# Patient Record
Sex: MALE | Race: WHITE | NOT HISPANIC OR LATINO | Employment: FULL TIME | URBAN - METROPOLITAN AREA
[De-identification: names, ages, dates, MRNs, and addresses within clinical notes are randomized per-mention and may not be internally consistent; named-entity substitution may affect disease eponyms.]

---

## 2022-02-15 ENCOUNTER — OFFICE VISIT (OUTPATIENT)
Dept: ORTHOPEDIC SURGERY | Facility: CLINIC | Age: 57
End: 2022-02-15

## 2022-02-15 VITALS
BODY MASS INDEX: 29.55 KG/M2 | DIASTOLIC BLOOD PRESSURE: 82 MMHG | WEIGHT: 195 LBS | HEIGHT: 68 IN | SYSTOLIC BLOOD PRESSURE: 110 MMHG

## 2022-02-15 DIAGNOSIS — M24.812 INTERNAL DERANGEMENT OF LEFT SHOULDER: ICD-10-CM

## 2022-02-15 DIAGNOSIS — M25.512 ACUTE PAIN OF LEFT SHOULDER: Primary | ICD-10-CM
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
## 2022-02-15 PROCEDURE — 99203 OFFICE O/P NEW LOW 30 MIN: CPT | Performed by: PHYSICIAN ASSISTANT

## 2022-02-15 NOTE — PROGRESS NOTES
[facility] [place] [facility] [place]    Subjective     Chief Complaint   Patient presents with   • Left Shoulder - Pain     DOI: 02/10/22         HPI  Micah Viera is a 56 y.o. male.  Right-hand-dominant. New patient presents for evaluation of left shoulder/upper arm pain.    LUCI: At work carrying heavy equipment that caused pulling/strain to arm/shoulder when he felt a pop in the posterior shoulder followed by severe pain shooting along anterior arm/biceps to antecubital fossa.    Pain scale: 8/10.  Severity of the pain moderate to severe.  Quality of the pain burning, stabbing, shooting.  Associated symptoms.  Activity related to pain working, movement of joint.  Pain relieved by resting, ice.  No reported numbness or tingling.  Prior treatments none.    Notes difficulty with lifting, reaching, dressing, driving and overhead activities.    Denies fever, chills, night sweats or other constitutional symptoms.      Past Medical History:   Diagnosis Date   • Arthritis of back       Past Surgical History:   Procedure Laterality Date   • BACK SURGERY  2008   • BACK SURGERY  2009   • KNEE SURGERY Right       Family History   Problem Relation Age of Onset   • Diabetes Mother    • Hypertension Father      Social History     Socioeconomic History   • Marital status:    Tobacco Use   • Smoking status: Never Smoker   • Smokeless tobacco: Current User     Types: Snuff   Vaping Use   • Vaping Use: Never used   Substance and Sexual Activity   • Alcohol use: Yes   • Drug use: Never   • Sexual activity: Defer      No current outpatient medications on file prior to visit.     No current facility-administered medications on file prior to visit.      Allergies   Allergen Reactions   • Lortab [Hydrocodone-Acetaminophen] Hives        The following portions of the patient's history were reviewed and updated as appropriate: allergies, current medications, past family history, past medical history, past social history,  "past surgical history and problem list.    Review of Systems   Constitutional: Negative.    HENT: Negative.    Eyes: Negative.    Respiratory: Negative.    Cardiovascular: Negative.    Gastrointestinal: Negative.    Endocrine: Negative.    Genitourinary: Negative.    Musculoskeletal: Positive for arthralgias.   Skin: Negative.    Allergic/Immunologic: Negative.    Neurological: Negative.    Hematological: Negative.    Psychiatric/Behavioral: Negative.         Objective      Physical Exam  /82   Ht 172.7 cm (68\")   Wt 88.5 kg (195 lb)   BMI 29.65 kg/m²     Body mass index is 29.65 kg/m².    GENERAL APPEARANCE: awake, alert & oriented x 3, in no acute distress and well developed, well nourished  PSYCH: normal mood and affect  LUNGS:  breathing nonlabored, no wheezing  EYES: sclera anicteric, pupils equal  CARDIOVASCULAR: palpable pulses. Capillary refill less than 2 seconds  INTEGUMENTARY: skin intact, no clubbing, cyanosis  NEUROLOGIC:  Normal Sensation        Ortho Exam  Left Shoulder  Skin: Intact without any erythema, warmth. Positive Karsten muscle.  Similar finding on left.  Tenderness: Posterior into anterior shoulder radiating along biceps.  Motion: Active , Abd 140, ER (elbows at side) 65, IR side of body.  Impingement: Neer mild discomfort.  Solomon negative.  Rotaor cuff: Partha/Empty can positive. Drop arm negative. Bear hug mild discomfort.  Biceps: Speed's positive.  Motor: Grossly intact to Ax/MSC/R/U/M/AIN/PIN  Sensory: Grossly intact to Ax/MSC/R/U/M nerve distributions.  Vascular: 2+ radial pulse with brisk capillary refill into each digit.      Imaging/Studies  Ordered left shoulder plain films.  Imaging read/interpreted by Dr. Fan.    Imaging Results (Last 7 Days)     Procedure Component Value Units Date/Time    XR Shoulder 2+ View Left [[id number]] Resulted: 02/15/22 1414     Updated: 02/15/22 1415    Narrative:      Left Shoulder X-Ray    Indication: Pain    Study:  AP, axillary " lateral, and scapular Y views    Comparison: None    Findings:  No acute fractures are visualized  No bony lesions are visualized.  Normal soft tissue appearance  AC joint: Severe hypertrophic joint space narrowing with bony fragments   sitting superior to the AC joint  Glenohumeral joint: Mild joint space narrowing with an early osteophyte   noted off the inferior aspect of the humeral head  Acromion type: 2      Impression:    No acute bony abnormalities noted  Type II acromion  Severe hypertrophic AC joint arthritis            Assessment/Plan        ICD-10-CM ICD-9-CM   1. Acute pain of left shoulder  M25.512 719.41   2. Internal derangement of left shoulder  M24.812 719.81   3. Work related injury  Y99.0 959.9       Orders Placed This Encounter   Procedures   • XR Shoulder 2+ View Left        -Acute left shoulder pain with internal derangement (suspect proximal biceps rupture and questionable rotator cuff tear) secondary to work related injury.  -Ordered MRI of left shoulder  -Recommend OTC pain medication.  -Off work until MRI completed and results known.  -Follow up after MRI completed--appointment needs to be on Tuesday or Thursday.  -Questions and concerns answered.    History, exam and imaging discussed with Dr. Fan, who agrees with above assessment and plan.      Medical Decision Making  Management Options : over-the-counter medicine  Data/Risk: radiology tests       Tiffanie Ramos PA-C  02/15/22  22:40 EST               EMR Dragon/Transcription disclaimer:  Much of this encounter note is an electronic transcription of spoken language to printed text. Electronic transcription of spoken language may permit erroneous, or at times, nonsensical words or phrases to be inadvertently transcribed. Although I have reviewed the note for such errors, some may still exist.

## 2022-03-15 ENCOUNTER — HOSPITAL ENCOUNTER (OUTPATIENT)
Dept: MRI IMAGING | Facility: HOSPITAL | Age: 57
Discharge: HOME | End: 2022-03-15
Admitting: PHYSICIAN ASSISTANT

## 2022-03-15 DIAGNOSIS — M25.512 ACUTE PAIN OF LEFT SHOULDER: ICD-10-CM

## 2022-03-15 DIAGNOSIS — M24.812 INTERNAL DERANGEMENT OF LEFT SHOULDER: ICD-10-CM
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
## 2022-03-15 PROCEDURE — 73221 MRI JOINT UPR EXTREM W/O DYE: CPT

## 2022-03-22 ENCOUNTER — OFFICE VISIT (OUTPATIENT)
Dept: ORTHOPEDIC SURGERY | Facility: CLINIC | Age: 57
End: 2022-03-22

## 2022-03-22 VITALS
DIASTOLIC BLOOD PRESSURE: 80 MMHG | WEIGHT: 196 LBS | BODY MASS INDEX: 29.7 KG/M2 | HEIGHT: 68 IN | SYSTOLIC BLOOD PRESSURE: 122 MMHG

## 2022-03-22 DIAGNOSIS — S46.212D RUPTURE OF PROXIMAL BICEPS TENDON, LEFT, SUBSEQUENT ENCOUNTER: ICD-10-CM

## 2022-03-22 DIAGNOSIS — M19.012 OSTEOARTHRITIS OF LEFT ACROMIOCLAVICULAR JOINT: ICD-10-CM

## 2022-03-22 DIAGNOSIS — M75.92 SUPRASPINATUS TENDINITIS, LEFT: ICD-10-CM

## 2022-03-22 DIAGNOSIS — M25.512 ACUTE PAIN OF LEFT SHOULDER: Primary | ICD-10-CM
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
## 2022-03-22 PROCEDURE — 99213 OFFICE O/P EST LOW 20 MIN: CPT | Performed by: PHYSICIAN ASSISTANT

## 2022-03-22 RX ORDER — DICYCLOMINE HYDROCHLORIDE 10 MG/1
10 CAPSULE ORAL
COMMUNITY
End: 2022-09-15

## 2022-03-22 NOTE — PROGRESS NOTES
"    [facility] Note        Subjective     CC: Follow-up (Post MRI 03/15/22 -  Acute pain of left shoulder  DOI: 02/10/22 )      DYLAN Viera is a 56 y.o. male.  Patient returns today for MRI follow-up of his left shoulder.    He continues to complain of pain with a pain scale of 8/9.  He is using a sling.  Notes he has problems with sleeping, working on movement of the shoulder joint.  Pain travels from his shoulder down to his elbow biceps.  Describes the pain as burning, stabbing, shooting.  No reported numbness or tingling.  Continues to have difficulty with any lifting, reaching, pushing, pulling and overhead activities.    Overall, patient's symptoms are unchanged since time of injury.    ROS:    Constiutional:Pt denies fever, chills, nausea, or vomiting.  MSK:as above        Objective      Past Medical History  Past Medical History:   Diagnosis Date   • Arthritis of back          Physical Exam  /80   Ht 172.7 cm (67.99\")   Wt 88.9 kg (196 lb)   BMI 29.81 kg/m²     Body mass index is 29.81 kg/m².    Patient is well nourished and well developed.        Ortho Exam  Left Shoulder  Skin: Intact without any erythema, warmth. Positive Karsten muscle.  Patient had a camera picture that showed approximately 2 days after he show me ecchymosis and bruising noted along the anterior arm consistent with proximal long head biceps rupture findings.  Tenderness:  Continues to note pain posterior into anterior shoulder with pain radiating to elbow anteriorly along biceps muscle belly.    Motion: Active , Abd 140, ER (elbows at side) 65, IR side of body.  Impingement: Neer  positive.  Solomon negative.  Rotaor cuff: Partha/Empty can positive. Drop arm negative. Bear hug mild discomfort.  ACJ: Adduction/crossover test positive.  Biceps: Speed's positive.  Motor: Grossly intact to Ax/MSC/R/U/M/AIN/PIN  Sensory: Grossly intact to Ax/MSC/R/U/M nerve distributions.  Vascular: 2+ radial " pulse with brisk capillary refill into each digit.    Left elbow  Hook test shows intact distal biceps.  Positive Karsten deformity.  Positive tenderness noted along anterior arm biceps into insertion.  Motion slightly limited due to pain.       Imaging/Labs/EMG Reviewed:  Dr. Fan and I reviewed MRI performed on 3/15/2022.    PROCEDURE: MRI SHOULDER LEFT WO CONTRAST-     HISTORY: Shoulder trauma, rotator cuff tear suspected, xray done;  M25.512-Pain in left shoulder; M24.812-Other specific joint derangements  of left shoulder, not elsewhere classified; Y99.0-Civilian activity done  for income or pay     PROCEDURE: Multiplanar multisequence imaging of the shoulder was  performed.     FINDINGS: There is thickening of the distal supraspinatus tendon  consistent with tendinosis. There is an intrasubstance tear of the  supraspinatus tendon at its attachment site to the greater tuberosity.  There is no evidence of a full-thickness rotator cuff tendon tear. There  is no evidence of abnormal rotator cuff muscle edema or atrophy. There  is a complex tear of the superior labrum. The intra-articular portions  of the biceps tendon are not well seen in the biceps tendon may either  be torn or displaced medially. There is no joint capsule thickening.  There is a type II acromion. Hypertrophic degenerative changes are  present at the acromioclavicular joint. Fluid is noted in the  subacromial/subdeltoid bursa.     IMPRESSION:     1. Findings consistent with supraspinatus tendinosis with an  intrasubstance tear of the supraspinatus tendon at its attachment site  to the greater tuberosity.  2. Complex tear of the superior labrum.  3. Nonvisualization of the intra-articular portions of the biceps tendon  which may be torn or displaced medially with joint.  4. Acromioclavicular joint space arthrosis with fluid in the  subacromial/subdeltoid bursa.     This report was signed and finalized on 3/15/2022 11:32 AM by  Karen Gama M.D..      Assessment:  1. Acute pain of left shoulder    2. Supraspinatus tendinitis, left    3. Rupture of proximal biceps tendon, left, subsequent encounter    4. Osteoarthritis of left acromioclavicular joint    5. Work related injury        Plan:  Left shoulder pain due to supraspinatus tendinitis, proximal bicep tendon rupture and ACJ osteoarthritis--work-related.  At this time no surgical intervention is warranted.  Patient was referred to formal PT (ADAMARIS Kahn).  We discussed subacromial corticosteroid injection as well as possible AC joint injection.  Patient would like to hold on any injections at this time and try formal PT first.  He needs to wean out of the sling.  Encourage range of motion of the shoulder and elbow.  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  To remain off work until next follow-up visit due to the strenuous nature of his job.  Continue with over-the-counter pain medication as needed.  Follow-up 4 weeks for repeat evaluation.  Depending on how he is doing with formal PT will consider possible injections at that time.  Questions and concerns answered.    History, exam and imaging all discussed with Dr. Fan agrees with the above assessment and plan.      Tiffanie Ramos PA-C  03/25/22  08:08 EDT      Dictated Utilizing Dragon Dictation.

## 2022-03-28 ENCOUNTER — TELEPHONE (OUTPATIENT)
Dept: ORTHOPEDIC SURGERY | Facility: CLINIC | Age: 57
End: 2022-03-28

## 2022-03-28 DIAGNOSIS — S46.212D RUPTURE OF PROXIMAL BICEPS TENDON, LEFT, SUBSEQUENT ENCOUNTER: ICD-10-CM

## 2022-03-28 DIAGNOSIS — M25.512 ACUTE PAIN OF LEFT SHOULDER: Primary | ICD-10-CM

## 2022-03-28 DIAGNOSIS — M75.92 SUPRASPINATUS TENDINITIS, LEFT: ICD-10-CM
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
## 2022-03-28 DIAGNOSIS — M19.012 OSTEOARTHRITIS OF LEFT ACROMIOCLAVICULAR JOINT: ICD-10-CM

## 2022-03-28 NOTE — TELEPHONE ENCOUNTER
SAYS IT WAS DISCUSSED WITH LEILA THAT IN THIS OFFICE WE DO NOT REPLACE THE BICEP TEAR UP TO THE SHOULDER. PATIENTS WANTS TO KNOW WHO CAN REPLACE THE BICEP MUSCLE TEAR TO THE SHOULDER IF HE NEEDS TO BE REFERRED ELSEWHERE? IF SO HIS WORKER[employer]S [employer] NEEDS TO KNOW.

## 2022-03-29 NOTE — TELEPHONE ENCOUNTER
In the majority of instances, there is no reason to do a proximal biceps repair.  In general, it does not decrease patient's ability to function normally and do normal daily activity.  It is usually treated with time, patient is in physical therapy.  If he would like another opinion, I am happy to refer him to someone else.  MRI and exam showed no indication of need for surgery.

## 2022-03-30 NOTE — TELEPHONE ENCOUNTER
Ok I called patient and explained what you said and he would like a referral to get a second opinion about surgery.   Mayela

## 2022-04-11 ENCOUNTER — TREATMENT (OUTPATIENT)
Dept: PHYSICAL THERAPY | Facility: CLINIC | Age: 57
End: 2022-04-11

## 2022-04-11 DIAGNOSIS — M25.512 ACUTE PAIN OF LEFT SHOULDER: Primary | ICD-10-CM

## 2022-04-11 PROCEDURE — 97161 PT EVAL LOW COMPLEX 20 MIN: CPT | Performed by: PHYSICAL THERAPIST

## 2022-04-11 PROCEDURE — 97110 THERAPEUTIC EXERCISES: CPT | Performed by: PHYSICAL THERAPIST

## 2022-04-11 NOTE — PROGRESS NOTES
Physical Therapy Initial Evaluation and Plan of Care      Patient: Micah Viera   : 1965  Diagnosis/ICD-10 Code:  No primary diagnosis found.  Referring practitioner: Tiffanie Ramos, *    Subjective Evaluation    History of Present Illness  Date of onset: 2/10/2022  Mechanism of injury: Pt reports that he was lifting and pulling at work and felt a pop in the L shoulder on . Pt reports that he had pain immediately and has been off work since. Pt reports he saw ortho on the  and again on the . Pt reports that MRI shows a torn biceps tendon but the doctor said he should heal in time and does not need surgery at this time. Pt reports lifting makes pain worse. Pt reports that he has been working on the shoulder to help improve motion and can go about his head with pain. Pt reports tightness and pain in the L shoulder with going behind head and behind back. Pt reports that rest helps the shoulder as well as ice.       Patient Occupation:   Pain  Current pain ratin  At best pain ratin  At worst pain ratin  Location: L shoulder  Quality: burning, dull ache and squeezing  Relieving factors: ice and rest  Aggravating factors: lifting, movement, outstretched reach, overhead activity and sleeping  Progression: improved    Hand dominance: right    Diagnostic Tests  MRI studies: abnormal    Patient Goals  Patient goals for therapy: decreased pain, increased motion, increased strength and return to work             Objective          Palpation   Left   Hypertonic in the biceps and latissimus.   Tenderness of the biceps, latissimus and supraspinatus.     Tenderness     Left Shoulder   Tenderness in the biceps tendon (proximal), coracoid process and supraspinatus tendon.     Additional Tenderness Details  Pain down into the L biceps with palpation of the coracoid process and short head of the biceps.     Neurological Testing     Reflexes   Left   Biceps  (C5/C6): trace (1+)  Brachioradialis (C6): absent (0)  Triceps (C7): absent (0)    Right   Biceps (C5/C6): normal (2+)  Brachioradialis (C6): absent (0)  Triceps (C7): absent (0)    Active Range of Motion   Left Shoulder   Flexion: 138 degrees   Abduction: 105 degrees     Right Shoulder   Flexion: 147 degrees   Abduction: 132 degrees     Passive Range of Motion   Left Shoulder   Flexion: 141 degrees   Abduction: 122 degrees   External rotation 90°: 70 degrees   Internal rotation 90°: 50 degrees     Right Shoulder   Flexion: WFL  Abduction: WFL  External rotation 90°: WFL  Internal rotation 90°: 60 degrees     Strength/Myotome Testing     Left Shoulder     Planes of Motion   External rotation at 0°: 3+   Internal rotation at 0°: 3+     Right Shoulder     Planes of Motion   External rotation at 0°: 4+   Internal rotation at 0°: 4+     Additional Strength Details  Pt with weakness due to pain.     Tests     Left Shoulder   Positive anterior slide.           Assessment & Plan     Assessment  Impairments: abnormal or restricted ROM, activity intolerance, impaired physical strength, lacks appropriate home exercise program and pain with function  Functional Limitations: carrying objects, lifting, sleeping, pushing, uncomfortable because of pain, reaching behind back and reaching overhead  Assessment details: Patient is a 56 year old male who comes to physical therapy following injury to the L shoulder at work. Signs and symptoms are consistent with L biceps tear resulting in pain, decreased ROM, decreased strength, and inability to perform all essential functional activities. Pt will benefit from skilled PT services to address the above issues.       Goals  Plan Goals: SHORT TERM GOALS:     2 weeks  1. Pt I w/ HEP  2. Pt to demonstrate PROM of the left shoulder to WFL to improve ability to perform ADL's  3. Pt to demonstrate ability to perform 30 minutes continuous activity in the clinic without increase in pain     LONG  TERM GOALS:   6 weeks  1. Pt to demonstrate AROM of the left shoulder to WNL to allow ability to perform all necessary functional activities  2. Pt to demonstrate ability to lift 5# OH with the left arm without increase in pain  3. Pt to report being able to work full duty without increase in pain in the shoulder  4. Pt to tolerate 60 minutes continuous activity in the clinic without increase in pain       Plan  Therapy options: will be seen for skilled therapy services  Planned modality interventions: cryotherapy, TENS, thermotherapy (hydrocollator packs) and ultrasound  Planned therapy interventions: body mechanics training, flexibility, functional ROM exercises, home exercise program, joint mobilization, manual therapy, motor coordination training, neuromuscular re-education, postural training, soft tissue mobilization, spinal/joint mobilization, strengthening, therapeutic activities and stretching  Frequency: 2x week  Duration in weeks: 6  Treatment plan discussed with: patient        Manual Therapy:         mins  34708;  Therapeutic Exercise:    13     mins  56039;     Neuromuscular Jessee:        mins  55720;    Therapeutic Activity:          mins  73892;     Gait Training:           mins  10613;     Ultrasound:          mins  93052;    Electrical Stimulation:         mins  90598 ( );  Dry Needling          mins self-pay    Timed Treatment:   13   mins   Total Treatment:     44   mins    PT SIGNATURE: Cj Wallace PT   [place] License: [id number]  DATE TREATMENT INITIATED: 4/11/2022    Initial Certification  Certification Period: 7/9/2022  I certify that the therapy services are furnished while this patient is under my care.  The services outlined above are required by this patient, and will be reviewed every 90 days.     PHYSICIAN: Tiffanie Ramos PA-C      DATE:     Please sign and return via fax to [phone].. Thank you, [facility].

## 2022-04-14 ENCOUNTER — TREATMENT (OUTPATIENT)
Dept: PHYSICAL THERAPY | Facility: CLINIC | Age: 57
End: 2022-04-14

## 2022-04-14 DIAGNOSIS — M25.512 ACUTE PAIN OF LEFT SHOULDER: Primary | ICD-10-CM

## 2022-04-14 PROCEDURE — 97110 THERAPEUTIC EXERCISES: CPT | Performed by: PHYSICAL THERAPIST

## 2022-04-14 PROCEDURE — 97112 NEUROMUSCULAR REEDUCATION: CPT | Performed by: PHYSICAL THERAPIST

## 2022-04-14 PROCEDURE — 97140 MANUAL THERAPY 1/> REGIONS: CPT | Performed by: PHYSICAL THERAPIST

## 2022-04-14 NOTE — PROGRESS NOTES
Physical Therapy Daily Progress Note      Visit #: 2    Micah Viera reports 6/10 pain today at rest.  Pt reports that he has a lot of soreness in the L shoulder with popping. Pt reports that he has a lot of trouble sleeping and is annoyed that his shoulder continues to hurt. Pt reports that pulling the bands at home hurts.         Objective Pt present to PT today with no distress at rest.     Pt given verbal and tactile cues to avoid rolling the L shoulder forward with activities today.     Pt constantly moved L shoulder into abduction while taking breaks with exercises.     Pt with increased L biceps burning following session today but no increased pain in the shoulder.       See Exercise, Manual, and Modality Logs for complete treatment.     Assessment/Plan  Pt continues to have pain in the L shoulder with pain into the L biceps. Pt continues to report pain in the back to the shoulder as well. Pt to continue with PT to attempt to help L shoulder motion, strength, and activity tolerance.       Progress per Plan of Care      Visit Diagnosis:    ICD-10-CM ICD-9-CM   1. Acute pain of left shoulder  M25.512 719.41            Manual Therapy:    24     mins  20630;  Therapeutic Exercise:    16     mins  89324;     Neuromuscular Jessee:    12    mins  15154;    Therapeutic Activity:          mins  03095;     Gait Training:           mins  86490;     Ultrasound:          mins  22971;    Electrical Stimulation:         mins  73292 ( );  Dry Needling          mins self-pay  Iontophoresis          mins 21372      Timed Treatment:   52   mins   Total Treatment:     54   mins    Cj Wallace, PT  Physical Therapist

## 2022-04-18 ENCOUNTER — TREATMENT (OUTPATIENT)
Dept: PHYSICAL THERAPY | Facility: CLINIC | Age: 57
End: 2022-04-18

## 2022-04-18 DIAGNOSIS — M25.512 ACUTE PAIN OF LEFT SHOULDER: Primary | ICD-10-CM

## 2022-04-18 PROCEDURE — 97140 MANUAL THERAPY 1/> REGIONS: CPT | Performed by: PHYSICAL THERAPIST

## 2022-04-18 PROCEDURE — 97112 NEUROMUSCULAR REEDUCATION: CPT | Performed by: PHYSICAL THERAPIST

## 2022-04-18 PROCEDURE — 97110 THERAPEUTIC EXERCISES: CPT | Performed by: PHYSICAL THERAPIST

## 2022-04-18 NOTE — PROGRESS NOTES
Physical Therapy Daily Progress Note      Visit #: 3    Micah Viera reports 8/10 pain today at rest.  Pt states that the front of the L shoulder seems to be a little better but the back of his shoulder is getting worse and is what really bothers him. Pt reports that he is seeing ortho again tomorrow. Pt states that he is concerned about the back of the shoulder because everyone is so concentrated on the biceps and SLAP tear and no one seems to be paying attention to the back of the shoulder.         Objective          Passive Range of Motion   Left Shoulder   Flexion: 114 degrees   Abduction: 107 degrees   External rotation 45°: 45 degrees   Internal rotation 45°: 44 degrees     Pt present to PT today with no distress at rest.     Pt educated to avoid painful ROMs in the L shoulder to avoid constant irritation of the shoulder.     Pt with guarding with all motions in the L shoulder today.     Pt reports numbness in the 2nd-5th digits with flexion stretching of the L shoulder.     Pt reached up to head-height for handle on cable machine with L hand without notable issue.       See Exercise, Manual, and Modality Logs for complete treatment.     Assessment/Plan  Pt continues to have pain in the L shoulder limiting comfortable exercise. Pt has limited motion due to pain although does not have much tightness in the shoulder noted. Pt to follow up with PT again after ortho visit and will continue with PT as tolerated to improve L shoulder motion, strength, and activity tolerance.       Progress per Plan of Care      Visit Diagnosis:    ICD-10-CM ICD-9-CM   1. Acute pain of left shoulder  M25.512 719.41            Manual Therapy:    24     mins  61326;  Therapeutic Exercise:    14     mins  12485;     Neuromuscular Jessee:    12    mins  30662;    Therapeutic Activity:          mins  75046;     Gait Training:           mins  85197;     Ultrasound:          mins  25248;    Electrical Stimulation:         mins  52085 (  );  Dry Needling          mins self-pay  Iontophoresis          mins 09232      Timed Treatment:   50   mins   Total Treatment:     56   mins    Cj Wallace, PT  Physical Therapist

## 2022-04-19 ENCOUNTER — OFFICE VISIT (OUTPATIENT)
Dept: ORTHOPEDIC SURGERY | Facility: CLINIC | Age: 57
End: 2022-04-19

## 2022-04-19 VITALS
DIASTOLIC BLOOD PRESSURE: 100 MMHG | SYSTOLIC BLOOD PRESSURE: 150 MMHG | WEIGHT: 195.99 LBS | BODY MASS INDEX: 29.7 KG/M2 | HEIGHT: 68 IN

## 2022-04-19 DIAGNOSIS — M25.512 ACUTE PAIN OF LEFT SHOULDER: Primary | ICD-10-CM

## 2022-04-19 DIAGNOSIS — M24.112 DEGENERATIVE TEAR OF GLENOID LABRUM, LEFT: ICD-10-CM

## 2022-04-19 DIAGNOSIS — M75.92 SUPRASPINATUS TENDINITIS, LEFT: ICD-10-CM
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
## 2022-04-19 DIAGNOSIS — S46.212D RUPTURE OF PROXIMAL BICEPS TENDON, LEFT, SUBSEQUENT ENCOUNTER: ICD-10-CM

## 2022-04-19 DIAGNOSIS — M75.112 INCOMPLETE TEAR OF LEFT ROTATOR CUFF, UNSPECIFIED WHETHER TRAUMATIC: ICD-10-CM

## 2022-04-19 PROCEDURE — 99214 OFFICE O/P EST MOD 30 MIN: CPT | Performed by: PHYSICIAN ASSISTANT

## 2022-04-19 NOTE — PROGRESS NOTES
"    [facility] [place] [facility]        Subjective     CC: Follow-up (4 week follow up; Acute pain of left shoulder )  DOI: 2/10/2022    DYLAN Viera is a 56 y.o. male.  Patient returns today for follow-up evaluation of his left shoulder.  He is currently attending formal PT but notes that he is having continued pain with no improvement of symptoms.  He localizes the pain to posterior aspect of the shoulder and anterior shoulder with radiation into anterior arm along the biceps.  He is interested in pursuing further options as conservative management thus far has not helped.    Current pain scale 7/10.  Continues to have swelling and popping in the shoulder.  Activity related to pain include sleeping, laying on the affected side, rising from a seated position, movement of the shoulder.  No reported numbness or tingling into the extremity.    Overall, patient's symptoms are unchanged.  This is a work-related injury and currently patient is not able to work.    ROS:    Constiutional:Pt denies fever, chills, nausea, or vomiting.  MSK:as above        Objective      Past Medical History  Past Medical History:   Diagnosis Date   • Arthritis of back          Physical Exam  /100   Ht 172.7 cm (67.99\")   Wt 88.9 kg (195 lb 15.8 oz)   BMI 29.81 kg/m²     Body mass index is 29.81 kg/m².    Patient is well nourished and well developed.        Ortho Exam  Left Shoulder  Skin: Intact without any erythema, warmth. Positive Karsten deformity.    Tenderness:   Positive posterior shoulder.  Positive anterior shoulder with radiation anterior arm along biceps muscle belly.  No tenderness noted to ACJ.      Motion: Passively forward flexion 140 degrees actively approximately 120 degrees.  Abduction actively 100 degrees.  ER (elbows at side) 45, IR back hip pocket to L5.  Patient is guarding on range of motion.  Impingement: Neer  positive.  Solomon negative.  Rotaor cuff: Partha/Empty can positive. Drop " arm negative. Bear hug positive.  Labrum: [place]'s positive   ACJ: Adduction/crossover test positive.  Biceps: Speed's positive.  Strength: 4-/5 subscapularis, supraspinatus.  Deltoid intact.  Motor: Grossly intact to Ax/MSC/R/U/M/AIN/PIN  Sensory: Grossly intact to Ax/MSC/R/U/M nerve distributions.      Imaging/Labs/EMG Reviewed:  No new imaging today.    Dr. Fan and I went back and we reviewed patient's MRI performed on 3/15/2022.  Biceps tendon not visualized within groove, diagnosed with a proximal biceps rupture but he still could have a few fibers intact that are causing pain along the muscle belly as well as deep inside the shoulder (pulling on the labrum).  Subscapularis does appears irregular which could indicate tear.  This could be causing the anterior shoulder pain as well.  No evidence of a full-thickness rotator cuff tear although he does have tendinosis noted to the supraspinatus and intersubstance tearing at the attachment of the greater tuberosity.      Assessment:  1. Acute pain of left shoulder    2. Incomplete tear of left rotator cuff, unspecified whether traumatic    3. Degenerative tear of glenoid labrum, left    4. Rupture of proximal biceps tendon, left, subsequent encounter    5. Supraspinatus tendinitis, left    6. Work related injury        Plan:  Left shoulder pain due to partial tear subscapularis, proximal bicep tendon rupture (few fibers may be still intact), SLAP, supraspinatus tendinitis.   Since patient has failed to improve with conservative management we discussed proceeding with surgical management.  This would include a left shoulder arthroscopy with complete evaluation of rotator cuff.  If tear is noted then we will proceed to debridement versus repair.  Completion of biceps tendon rupture if not already completed, debridement of labrum.  Evaluation of the underlying cartilage and other indicated procedures.    At this time not sure what is causing the sharp posterior  pain.  It could be the instability to the anterior aspect of the shoulder.  He understands that this shoulder scope night not reveal any issue with the posterior aspect of the shoulder and he could have continued pain to that area.  Discussed risk, benefits and indications of surgery along with postoperative recovery and rehab.  Patient was introduced to Dr. Fan.  For now he will just work on gentle range of motion and stretching exercises to the shoulder.  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  To remain off work for now.  Continue with over-the-counter pain medication as needed.  Questions and concerns answered.     Patient was also examined by Dr. Fan and he agrees with the above assessment and plan.    Indications, risks, benefits of surgical treatment were discussed with the patient. Surgical risks include but are not limited to pain, bleeding, infection, failure to relieve symptoms, need for further procedures, recurrence of symptoms, damage to healthy adjacent structures, stiffness, weakness, scar, DVT/PE, loss of limb or life. We also discussed the postoperative protocol and expected outcome. All questions were answered; the patient would like to proceed with surgical intervention.       Tiffanie Ramos PA-C  04/20/22  10:26 EDT      Dictated Utilizing Dragon Dictation.

## 2022-04-21 ENCOUNTER — TREATMENT (OUTPATIENT)
Dept: PHYSICAL THERAPY | Facility: CLINIC | Age: 57
End: 2022-04-21

## 2022-04-21 DIAGNOSIS — M25.512 ACUTE PAIN OF LEFT SHOULDER: Primary | ICD-10-CM

## 2022-04-21 PROCEDURE — 97110 THERAPEUTIC EXERCISES: CPT | Performed by: PHYSICAL THERAPIST

## 2022-04-21 PROCEDURE — 97140 MANUAL THERAPY 1/> REGIONS: CPT | Performed by: PHYSICAL THERAPIST

## 2022-04-21 NOTE — PROGRESS NOTES
I have reviewed the notes, assessments, and/or procedures performed by Tiffanie Ramos PA-C, I concur with her documentation of Micah Viera.      Patient seen and examined with Tiffanie in the office.  Patient had a proximal biceps rupture on the left.  His physical examination is consistent with a subscapularis tear and possible supraspinatus tear.  Significant weakness noted on all subscap testing.  MRI is reviewed and there is an irregularity at the upper subscap with partial-thickness tearing of the supraspinatus.  Patient is interested in definitive management.  Plan procedure will be arthroscopy of the left shoulder, plan on seeing a proximal biceps rupture.  Stump will be cleaned up.  We will then assess the subscap and anticipated being ruptured.  We will see if he needs an intra-articular repair versus a subacromial repair.  We will then go to the subacromial space and assess the supraspinatus and assess if that needs repairing.  We will leave the AC joint alone.  The risk, benefits, potential hazards were discussed with him.  He had the opportunity to ask questions and wishes to proceed.

## 2022-04-21 NOTE — PROGRESS NOTES
Physical Therapy Daily Progress Note      Visit #: 4    Micah Viera reports 6/10 pain today at rest.  Pt reports that he saw ortho a couple days ago and they decided to do surgery on the L shoulder. Pt reports that they want him to continue with mobility activities but to avoid lifting and strengthening at this time.         Objective Pt present to PT today with no distress at rest.     Pt tolerated PROM activities well today.       See Exercise, Manual, and Modality Logs for complete treatment.     Assessment/Plan  Pt continues to have pain in the L shoulder with ROM activities and stretching. Pt to continue with PT until he goes for surgery to help maintain motion.       Progress per Plan of Care      Visit Diagnosis:    ICD-10-CM ICD-9-CM   1. Acute pain of left shoulder  M25.512 719.41            Manual Therapy:    17     mins  63132;  Therapeutic Exercise:     23    mins  89432;     Neuromuscular Jessee:        mins  17044;    Therapeutic Activity:          mins  13846;     Gait Training:           mins  60524;     Ultrasound:          mins  96955;    Electrical Stimulation:         mins  67462 ( );  Dry Needling          mins self-pay  Iontophoresis          mins 09437      Timed Treatment:   40   mins   Total Treatment:     45   mins    Cj Wallace, PT  Physical Therapist

## 2022-04-25 ENCOUNTER — TREATMENT (OUTPATIENT)
Dept: PHYSICAL THERAPY | Facility: CLINIC | Age: 57
End: 2022-04-25

## 2022-04-25 DIAGNOSIS — M25.512 ACUTE PAIN OF LEFT SHOULDER: Primary | ICD-10-CM

## 2022-04-25 PROCEDURE — 97110 THERAPEUTIC EXERCISES: CPT | Performed by: PHYSICAL THERAPIST

## 2022-04-25 PROCEDURE — 97140 MANUAL THERAPY 1/> REGIONS: CPT | Performed by: PHYSICAL THERAPIST

## 2022-04-25 NOTE — PROGRESS NOTES
Physical Therapy Daily Progress Note      Visit #: 5    Micah Viera reports 6/10 pain today at rest.  Pt reports that his shoulder stays at around a 5-6/10 pain. Pt reports that he has not heard anything about his surgery. Pt states that he has had a lot more tingling in the L fingers lately. Pt reports that his pain still goes from the front into the back of the shoulder.         Objective          Passive Range of Motion   Left Shoulder   Flexion: 140 degrees   Abduction: 136 degrees   External rotation 45°: 66 degrees   Internal rotation 45°: 65 degrees     Pt present to PT today with no distress at rest.     Pt with pain with all activities today and motion limited due to pain.       See Exercise, Manual, and Modality Logs for complete treatment.     Assessment/Plan  Pt continues to have pain in the L shoulder limiting motion and function. Pt to continue with PT to help maintain and improve motion while waiting for surgery.       Progress per Plan of Care      Visit Diagnosis:    ICD-10-CM ICD-9-CM   1. Acute pain of left shoulder  M25.512 719.41            Manual Therapy:    12     mins  11582;  Therapeutic Exercise:    24     mins  73204;     Neuromuscular Jessee:        mins  47088;    Therapeutic Activity:          mins  78617;     Gait Training:           mins  23230;     Ultrasound:          mins  84428;    Electrical Stimulation:         mins  18629 ( );  Dry Needling          mins self-pay  Iontophoresis          mins 66842      Timed Treatment:   36   mins   Total Treatment:     45   mins    Cj Wallace, PT  Physical Therapist

## 2022-04-28 ENCOUNTER — TREATMENT (OUTPATIENT)
Dept: PHYSICAL THERAPY | Facility: CLINIC | Age: 57
End: 2022-04-28

## 2022-04-28 DIAGNOSIS — M25.512 ACUTE PAIN OF LEFT SHOULDER: Primary | ICD-10-CM

## 2022-04-28 PROCEDURE — 97110 THERAPEUTIC EXERCISES: CPT | Performed by: PHYSICAL THERAPIST

## 2022-04-28 PROCEDURE — 97140 MANUAL THERAPY 1/> REGIONS: CPT | Performed by: PHYSICAL THERAPIST

## 2022-04-28 NOTE — PROGRESS NOTES
Physical Therapy Daily Progress Note      Visit #: 6    Micah Viera reports 6/10 pain today at rest.  Pt reports that he has been working his shoulder at home but still has a lot of pain around the front into the back. Pt reports that not much has changed. Pt reports that his  has not heard from his doctor about when his surgery will be.         Objective          Passive Range of Motion   Left Shoulder   Flexion: 148 degrees   Abduction: 140 degrees   External rotation 90°: 73 degrees   Internal rotation 90°: 60 degrees     Pt present to PT today with no distress at rest.     Pt with pain in the L shoulder with mobility exercises today.       See Exercise, Manual, and Modality Logs for complete treatment.     Assessment/Plan  Pt to continue with PT to help maintain motion while he waits for surgery.       Progress per Plan of Care      Visit Diagnosis:    ICD-10-CM ICD-9-CM   1. Acute pain of left shoulder  M25.512 719.41            Manual Therapy:    15     mins  86361;  Therapeutic Exercise:    26     mins  21354;     Neuromuscular Jessee:        mins  21057;    Therapeutic Activity:          mins  97211;     Gait Training:           mins  70475;     Ultrasound:          mins  35857;    Electrical Stimulation:         mins  22350 ( );  Dry Needling          mins self-pay  Iontophoresis          mins 31143      Timed Treatment:   41   mins   Total Treatment:     44   mins    Cj Wallace, PT  Physical Therapist

## 2022-05-02 ENCOUNTER — TREATMENT (OUTPATIENT)
Dept: PHYSICAL THERAPY | Facility: CLINIC | Age: 57
End: 2022-05-02

## 2022-05-02 DIAGNOSIS — M25.512 ACUTE PAIN OF LEFT SHOULDER: Primary | ICD-10-CM

## 2022-05-02 PROCEDURE — 97140 MANUAL THERAPY 1/> REGIONS: CPT | Performed by: PHYSICAL THERAPIST

## 2022-05-02 PROCEDURE — 97112 NEUROMUSCULAR REEDUCATION: CPT | Performed by: PHYSICAL THERAPIST

## 2022-05-02 PROCEDURE — 97110 THERAPEUTIC EXERCISES: CPT | Performed by: PHYSICAL THERAPIST

## 2022-05-02 NOTE — PROGRESS NOTES
Physical Therapy Daily Progress Note      Visit #: 7    Micah Viera reports 6-7/10 pain today at rest.  Pt reports that his shoulder is hurting more than usual. Pt reports that he did not do a lot to get the shoulder moving this weekend and does not know if that has more to do with it or if he slept on the shoulder wrong last night.         Objective Pt present to PT today with no distress at rest.     Pt with pain with activities in the clinic today.     Pt able to do new adduction activity today without irritation in the L shoulder although still had pain.       See Exercise, Manual, and Modality Logs for complete treatment.     Assessment/Plan  Pt continues to have pain in the L shoulder limiting motion and function. Pt still has not heard about his surgery but hopes to find something out this week. Pt to follow up on Thursday to continue with PT to maintain mobility as he awaits surgery.       Progress per Plan of Care      Visit Diagnosis:    ICD-10-CM ICD-9-CM   1. Acute pain of left shoulder  M25.512 719.41            Manual Therapy:    13     mins  53286;  Therapeutic Exercise:    19     mins  95803;     Neuromuscular Jessee:    8    mins  24596;    Therapeutic Activity:          mins  23116;     Gait Training:           mins  69494;     Ultrasound:          mins  63035;    Electrical Stimulation:         mins  55344 ( );  Dry Needling          mins self-pay  Iontophoresis          mins 60847      Timed Treatment:  40    mins   Total Treatment:     46   mins    Cj Wallace, PT  Physical Therapist

## 2022-05-05 ENCOUNTER — TREATMENT (OUTPATIENT)
Dept: PHYSICAL THERAPY | Facility: CLINIC | Age: 57
End: 2022-05-05

## 2022-05-05 DIAGNOSIS — M25.512 ACUTE PAIN OF LEFT SHOULDER: Primary | ICD-10-CM

## 2022-05-05 PROCEDURE — 97110 THERAPEUTIC EXERCISES: CPT | Performed by: PHYSICAL THERAPIST

## 2022-05-05 PROCEDURE — 97140 MANUAL THERAPY 1/> REGIONS: CPT | Performed by: PHYSICAL THERAPIST

## 2022-05-05 NOTE — PROGRESS NOTES
Physical Therapy Daily Progress Note      Visit #: 8    Micah Viera reports 6/10 pain today at rest.  Pt reports that he must have slept on his shoulder wrong because the front is hurting pretty bad today. Pt reports that his work comp adjustor approved the surgery and is now waiting to hear from his doctor about the date.         Objective Pt present to PT today with no distress at rest.     Pt with increased irritation and tenderness in the L long head of the biceps tendon today with mobilization and palpation.     Pt with pain in the anterior shoulder with all activities today.       See Exercise, Manual, and Modality Logs for complete treatment.     Assessment/Plan  Pt continues to have pain in the L shoulder. His surgery has been approved and will let PT know when his surgery is scheduled. Pt to continue with PT to maintain motion as tolerated.       Progress per Plan of Care      Visit Diagnosis:    ICD-10-CM ICD-9-CM   1. Acute pain of left shoulder  M25.512 719.41            Manual Therapy:    9     mins  51956;  Therapeutic Exercise:    30     mins  40407;     Neuromuscular Jessee:        mins  52005;    Therapeutic Activity:          mins  66496;     Gait Training:           mins  93765;     Ultrasound:          mins  29328;    Electrical Stimulation:         mins  60118 ( );  Dry Needling          mins self-pay  Iontophoresis          mins 75058      Timed Treatment:   39   mins   Total Treatment:     49   mins    Cj Wallace, PT  Physical Therapist

## 2022-05-10 DIAGNOSIS — M75.92 SUPRASPINATUS TENDINITIS, LEFT: ICD-10-CM
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
## 2022-05-10 DIAGNOSIS — M24.112 DEGENERATIVE TEAR OF GLENOID LABRUM, LEFT: ICD-10-CM

## 2022-05-10 DIAGNOSIS — M75.112 INCOMPLETE TEAR OF LEFT ROTATOR CUFF, UNSPECIFIED WHETHER TRAUMATIC: ICD-10-CM

## 2022-05-10 DIAGNOSIS — M25.512 ACUTE PAIN OF LEFT SHOULDER: ICD-10-CM

## 2022-05-10 DIAGNOSIS — S46.212D RUPTURE OF PROXIMAL BICEPS TENDON, LEFT, SUBSEQUENT ENCOUNTER: Primary | ICD-10-CM

## 2022-06-01 ENCOUNTER — LAB (OUTPATIENT)
Dept: LAB | Facility: HOSPITAL | Age: 57
End: 2022-06-01

## 2022-06-01 DIAGNOSIS — M75.112 INCOMPLETE TEAR OF LEFT ROTATOR CUFF, UNSPECIFIED WHETHER TRAUMATIC: ICD-10-CM

## 2022-06-01 DIAGNOSIS — M75.92 SUPRASPINATUS TENDINITIS, LEFT: ICD-10-CM

## 2022-06-01 DIAGNOSIS — M25.512 ACUTE PAIN OF LEFT SHOULDER: ICD-10-CM
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
## 2022-06-01 DIAGNOSIS — M24.112 DEGENERATIVE TEAR OF GLENOID LABRUM, LEFT: ICD-10-CM

## 2022-06-01 DIAGNOSIS — S46.212D RUPTURE OF PROXIMAL BICEPS TENDON, LEFT, SUBSEQUENT ENCOUNTER: ICD-10-CM

## 2022-06-01 LAB — SARS-COV-2 RNA PNL SPEC NAA+PROBE: NOT DETECTED

## 2022-06-01 PROCEDURE — C9803 HOPD COVID-19 SPEC COLLECT: HCPCS

## 2022-06-01 PROCEDURE — U0004 COV-19 TEST NON-CDC HGH THRU: HCPCS

## 2022-06-03 ENCOUNTER — OUTSIDE FACILITY SERVICE (OUTPATIENT)
Dept: ORTHOPEDIC SURGERY | Facility: CLINIC | Age: 57
End: 2022-06-03

## 2022-06-03 DIAGNOSIS — Z98.890 S/P ARTHROSCOPY OF SHOULDER: Primary | ICD-10-CM

## 2022-06-03 PROCEDURE — 29823 SHO ARTHRS SRG XTNSV DBRDMT: CPT | Performed by: ORTHOPAEDIC SURGERY

## 2022-06-03 RX ORDER — OXYCODONE HYDROCHLORIDE 5 MG/1
5 TABLET ORAL EVERY 6 HOURS PRN
Qty: 30 TABLET | Refills: 0 | Status: SHIPPED | OUTPATIENT
Start: 2022-06-03 | End: 2022-08-18

## 2022-06-03 RX ORDER — ONDANSETRON 4 MG/1
4 TABLET, FILM COATED ORAL EVERY 8 HOURS PRN
Qty: 10 TABLET | Refills: 1 | Status: SHIPPED | OUTPATIENT
Start: 2022-06-03 | End: 2022-06-21

## 2022-06-03 RX ORDER — DOCUSATE SODIUM 100 MG/1
100 CAPSULE, LIQUID FILLED ORAL 2 TIMES DAILY PRN
Qty: 62 CAPSULE | Refills: 0 | Status: SHIPPED | OUTPATIENT
Start: 2022-06-03 | End: 2022-06-21

## 2022-06-03 RX ORDER — SENNOSIDES 8.6 MG
650 CAPSULE ORAL EVERY 8 HOURS PRN
Qty: 30 TABLET | Refills: 0 | Status: SHIPPED | OUTPATIENT
Start: 2022-06-03 | End: 2022-08-09 | Stop reason: SDUPTHER

## 2022-06-03 RX ORDER — MELOXICAM 15 MG/1
15 TABLET ORAL DAILY
Qty: 30 TABLET | Refills: 0 | Status: SHIPPED | OUTPATIENT
Start: 2022-06-03 | End: 2022-07-01

## 2022-06-21 ENCOUNTER — OFFICE VISIT (OUTPATIENT)
Dept: ORTHOPEDIC SURGERY | Facility: CLINIC | Age: 57
End: 2022-06-21

## 2022-06-21 VITALS — TEMPERATURE: 97.7 F
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
## 2022-06-21 DIAGNOSIS — Z98.890 S/P ARTHROSCOPY OF SHOULDER: Primary | ICD-10-CM

## 2022-06-21 DIAGNOSIS — S46.212D RUPTURE OF PROXIMAL BICEPS TENDON, LEFT, SUBSEQUENT ENCOUNTER: ICD-10-CM

## 2022-06-21 DIAGNOSIS — M24.112 DEGENERATIVE TEAR OF GLENOID LABRUM, LEFT: ICD-10-CM

## 2022-06-21 PROCEDURE — 99024 POSTOP FOLLOW-UP VISIT: CPT | Performed by: PHYSICIAN ASSISTANT

## 2022-06-21 NOTE — PROGRESS NOTES
[facility] [place] [facility]        Subjective     Post-op (2.5 weeks s/p left shoulder arthroscopy with subacromial decompression, major debridement of superior labrum anterior to posterior, debridement of the biceps stump, and chondroplasty of the glenoid and humeral head 6/3/22)       DYLAN Viera is a 56 y.o. male.  Patient presents for their initial postop visit following left shoulder arthroscopy with SAD, debridement of labrum, debridement of biceps stump, chondroplasty of glenoid and humeral head performed on the above date by Dr. Fan.    Pain scale: 4/10. Notes swelling (using ice to help), popping to shoulder (posterior aspect) and stiffness. No numbness or tingling.    Patient denies any fever, chills, night sweats or other constitutional symptoms.          Objective      Physical Exam  Temp 97.7 °F (36.5 °C)     There is no height or weight on file to calculate BMI.        Ortho Exam  Peripheral Vascular   Left Upper Extremity    No cyanotic nail beds    Pink nail beds and rapid capillary refill   Palpation    Radial Pulse - Bilaterally normal    Musculoskeletal   Upper Extremity   Left Shoulder    Inspection and palpation:    Tenderness - mild to moderate    Swelling -mild    Sensation - normal    Incision--healing appropriately with sutures in place. No redness, warmth, drainage or evidence of infection.   ROJM:   Left:   External Rotation: PROM - 30 degrees   Elevation through flexion: PROM - 90 degrees      Imaging Reviewed:  No new imaging today.      Assessment:  1. S/P arthroscopy of shoulder    2. Rupture of proximal biceps tendon, left, subsequent encounter    3. Degenerative tear of glenoid labrum, left    4. Work related injury        Plan:  Status post left shoulder arthroscopy with SAD, debridement of labrum, debridement of biceps stump and chondroplasty of glenoid and humeral head--stable.  Sutures were removed today.  Reviewed arthroscopic images.  Ordered  formal PT ([facility]).  Sling as needed when out in public.  Stretching and ROM, without limitations or restrictions.   Recommend OTC pain medication.  Provided patient to remain off work until next evaluation.  Follow up with Dr. Fan in 4 weeks for repeat evaluation.  Questions and concerns answered.      Tiffanie Ramos PA-C  06/21/22  14:19 EDT      Dictated Utilizing Dragon Dictation.

## 2022-06-29 ENCOUNTER — TREATMENT (OUTPATIENT)
Dept: PHYSICAL THERAPY | Facility: CLINIC | Age: 57
End: 2022-06-29

## 2022-06-29 DIAGNOSIS — M25.512 CHRONIC LEFT SHOULDER PAIN: ICD-10-CM

## 2022-06-29 DIAGNOSIS — Z98.890 S/P ARTHROSCOPY OF LEFT SHOULDER: Primary | ICD-10-CM

## 2022-06-29 DIAGNOSIS — G89.29 CHRONIC LEFT SHOULDER PAIN: ICD-10-CM

## 2022-06-29 PROCEDURE — 97161 PT EVAL LOW COMPLEX 20 MIN: CPT | Performed by: PHYSICAL THERAPIST

## 2022-06-29 PROCEDURE — 97110 THERAPEUTIC EXERCISES: CPT | Performed by: PHYSICAL THERAPIST

## 2022-06-29 PROCEDURE — 97140 MANUAL THERAPY 1/> REGIONS: CPT | Performed by: PHYSICAL THERAPIST

## 2022-06-29 NOTE — PROGRESS NOTES
Physical Therapy Initial Evaluation and Plan of Care      Patient: Micah Viera   : 1965  Diagnosis/ICD-10 Code:  S/P arthroscopy of left shoulder [Z98.890]  Referring practitioner: Tiffanie Ramos, *    Subjective Evaluation    History of Present Illness  Date of surgery: 6/3/2022  Mechanism of injury: Pt reports having arthroscopy of the L shoulder to debride and clean out the L shoulder. Pt reports his biceps tendon being torn 3/4s of the way according to the surgeon but was not repaired. Pt reports no restrictions to motion and surgeon wants to push ROM first with PT. Pt reports he is not to be pushing, pulling, or lifting with the shoulder at this time.       Patient Occupation: .  Pain  Current pain ratin  Quality: throbbing, dull ache and sharp  Relieving factors: ice, rest and medications  Progression: improved    Hand dominance: right    Treatments  Previous treatment: medication and physical therapy  Current treatment: medication  Patient Goals  Patient goals for therapy: decreased pain, increased motion, increased strength and return to work             Objective          Palpation   Left   Tenderness of the upper trapezius.     Active Range of Motion   Left Shoulder   Flexion: 139 degrees   Abduction: 100 degrees     Right Shoulder   Flexion: 170 degrees   Abduction: 151 degrees     Passive Range of Motion   Left Shoulder   Flexion: 146 degrees   Abduction: 135 degrees   External rotation 90°: 80 degrees   Internal rotation 90°: 57 degrees     Right Shoulder   Flexion: WFL  Abduction: WFL  External rotation 90°: WFL  Internal rotation 90°: WFL          Assessment & Plan     Assessment  Impairments: abnormal muscle tone, abnormal or restricted ROM, activity intolerance, impaired physical strength, lacks appropriate home exercise program and pain with function  Functional Limitations: carrying objects, lifting, sleeping, pushing, uncomfortable because of pain, reaching  behind back and reaching overhead  Assessment details: Patient is a 56 year old male who comes to physical therapy following surgery to debride and decompress the L shoulder and subacromial space. Signs and symptoms are consistent with s/p arthroscopic surgery resulting in pain, decreased ROM, decreased strength, and inability to perform all essential functional activities. Pt will benefit from skilled PT services to address the above issues.     Prognosis: good    Goals  Plan Goals: SHORT TERM GOALS:     4 weeks  1. Pt I w/ HEP  2. Pt to demonstrate PROM of the left shoulder to WFL to improve ability to perform ADL's  3. Pt to demonstrate ability to perform 30 minutes continuous activity in the clinic without increase in pain     LONG TERM GOALS:   8 weeks  1. Pt to demonstrate AROM of the left shoulder to WNL to allow ability to perform all necessary functional activities  2. Pt to demonstrate ability to lift 5# OH with the left arm without increase in pain  3. Pt to report being able to work full duty without increase in pain in the shoulder  4. Pt to tolerate 60 minutes continuous activity in the clinic without increase in pain       Plan  Therapy options: will be seen for skilled therapy services  Planned modality interventions: cryotherapy  Planned therapy interventions: body mechanics training, fine motor coordination training, flexibility, functional ROM exercises, home exercise program, joint mobilization, manual therapy, motor coordination training, neuromuscular re-education, postural training, soft tissue mobilization, spinal/joint mobilization, strengthening, stretching and therapeutic activities  Frequency: 2x week  Duration in weeks: 12  Treatment plan discussed with: patient        Manual Therapy:    10     mins  81848;  Therapeutic Exercise:    15     mins  61128;     Neuromuscular Jessee:        mins  22879;    Therapeutic Activity:          mins  95202;     Gait Training:           mins  24449;      Ultrasound:          mins  05669;    Electrical Stimulation:         mins  80939 ( );  Dry Needling          mins self-pay    Timed Treatment:   25   mins   Total Treatment:     52   mins    PT SIGNATURE: MARTIN Alvarez License: [id number]  DATE TREATMENT INITIATED: 6/29/2022    Initial Certification  Certification Period: 9/26/2022  I certify that the therapy services are furnished while this patient is under my care.  The services outlined above are required by this patient, and will be reviewed every 90 days.     PHYSICIAN: Tiffanie Ramos PA-C      DATE:     Please sign and return via fax to [phone].. Thank you, [facility].

## 2022-07-01 ENCOUNTER — TREATMENT (OUTPATIENT)
Dept: PHYSICAL THERAPY | Facility: CLINIC | Age: 57
End: 2022-07-01

## 2022-07-01 DIAGNOSIS — G89.29 CHRONIC LEFT SHOULDER PAIN: ICD-10-CM

## 2022-07-01 DIAGNOSIS — Z98.890 S/P ARTHROSCOPY OF LEFT SHOULDER: Primary | ICD-10-CM

## 2022-07-01 DIAGNOSIS — M25.512 CHRONIC LEFT SHOULDER PAIN: ICD-10-CM

## 2022-07-01 PROCEDURE — 97112 NEUROMUSCULAR REEDUCATION: CPT | Performed by: PHYSICAL THERAPIST

## 2022-07-01 PROCEDURE — 97110 THERAPEUTIC EXERCISES: CPT | Performed by: PHYSICAL THERAPIST

## 2022-07-01 PROCEDURE — 97140 MANUAL THERAPY 1/> REGIONS: CPT | Performed by: PHYSICAL THERAPIST

## 2022-07-01 RX ORDER — MELOXICAM 15 MG/1
15 TABLET ORAL DAILY
Qty: 30 TABLET | Refills: 0 | Status: SHIPPED | OUTPATIENT
Start: 2022-07-01 | End: 2022-10-18

## 2022-07-01 NOTE — PROGRESS NOTES
Physical Therapy Daily Treatment Note      Visit #: 2    Micah Viera reports 2/10 pain today at rest.  Pt reports that he has been stretching at home. Pt reports that his L shoulder has been feeling tight and is mostly feeling pain in the anterior shoulder.         Objective Pt present to PT today with no distress at rest.     Pt with some pinching in the L anterior shoulder with stretching today.     Pt with tenderness in the L long head of the biceps with palpation and cross friction massage.       See Exercise, Manual, and Modality Logs for complete treatment.     Assessment/Plan  Pt continues to have some pain following arthroscopy of the L shoulder. Pt is doing well with HEP and activities in the clinic and will progress as tolerated. Pt to continue with PT to improve L shoulder motion, function, and strength per protocol       Progress per Plan of Care      Visit Diagnosis:    ICD-10-CM ICD-9-CM   1. S/P arthroscopy of left shoulder  Z98.890 V45.89   2. Chronic left shoulder pain  M25.512 719.41    G89.29 338.29            Manual Therapy:    17     mins  01757;  Therapeutic Exercise:    20     mins  30044;     Neuromuscular Jessee:    13    mins  08963;    Therapeutic Activity:          mins  01847;     Gait Training:           mins  42318;     Ultrasound:          mins  03576;    Electrical Stimulation:         mins  07182 ( );  Dry Needling          mins self-pay  Iontophoresis          mins 04400      Timed Treatment:   50   mins   Total Treatment:     60   mins    Cj Wallace, PT  Physical Therapist

## 2022-07-07 ENCOUNTER — TREATMENT (OUTPATIENT)
Dept: PHYSICAL THERAPY | Facility: CLINIC | Age: 57
End: 2022-07-07

## 2022-07-07 DIAGNOSIS — M25.512 CHRONIC LEFT SHOULDER PAIN: ICD-10-CM

## 2022-07-07 DIAGNOSIS — Z98.890 S/P ARTHROSCOPY OF LEFT SHOULDER: Primary | ICD-10-CM

## 2022-07-07 DIAGNOSIS — G89.29 CHRONIC LEFT SHOULDER PAIN: ICD-10-CM

## 2022-07-07 PROCEDURE — 97140 MANUAL THERAPY 1/> REGIONS: CPT | Performed by: PHYSICAL THERAPIST

## 2022-07-07 PROCEDURE — 97112 NEUROMUSCULAR REEDUCATION: CPT | Performed by: PHYSICAL THERAPIST

## 2022-07-07 PROCEDURE — 97110 THERAPEUTIC EXERCISES: CPT | Performed by: PHYSICAL THERAPIST

## 2022-07-14 ENCOUNTER — TREATMENT (OUTPATIENT)
Dept: PHYSICAL THERAPY | Facility: CLINIC | Age: 57
End: 2022-07-14

## 2022-07-14 DIAGNOSIS — Z98.890 S/P ARTHROSCOPY OF LEFT SHOULDER: Primary | ICD-10-CM

## 2022-07-14 DIAGNOSIS — G89.29 CHRONIC LEFT SHOULDER PAIN: ICD-10-CM

## 2022-07-14 DIAGNOSIS — M25.512 CHRONIC LEFT SHOULDER PAIN: ICD-10-CM

## 2022-07-14 PROCEDURE — 97110 THERAPEUTIC EXERCISES: CPT | Performed by: PHYSICAL THERAPIST

## 2022-07-14 PROCEDURE — 97140 MANUAL THERAPY 1/> REGIONS: CPT | Performed by: PHYSICAL THERAPIST

## 2022-07-14 PROCEDURE — 97112 NEUROMUSCULAR REEDUCATION: CPT | Performed by: PHYSICAL THERAPIST

## 2022-07-14 NOTE — PROGRESS NOTES
Physical Therapy Daily Treatment Note      Visit #: 4    Micah Viera reports 5-6/10 pain today at rest.  Pt reports that since last session, he has had 5-6/10 pain and has had to ice the shoulder 3 times a day. Pt reports that we did not do anything out of the ordinary last session.         Objective Pt present to PT today with no distress at rest.     Pt instructed to avoid impingement position to reduce pain this weekend.     Pt given tactile cues to squeeze shoulder blades with band activities.     Pt with no increased pain in the shoulder with activities today and reduced pain to 3/10 following ice.       See Exercise, Manual, and Modality Logs for complete treatment.     Assessment/Plan  Pt continues to have pain in the L shoulder although his motion passively is good and does not pop or click when scapula is is stabilized. Pt to continue with PT to help reduce pain in the L shoulder and increase function to improve activity tolerance.       Progress per Plan of Care      Visit Diagnosis:    ICD-10-CM ICD-9-CM   1. S/P arthroscopy of left shoulder  Z98.890 V45.89   2. Chronic left shoulder pain  M25.512 719.41    G89.29 338.29            Manual Therapy:    14     mins  98011;  Therapeutic Exercise:    12     mins  85728;     Neuromuscular Jessee:    18    mins  64826;    Therapeutic Activity:          mins  39066;     Gait Training:           mins  81136;     Ultrasound:          mins  28232;    Electrical Stimulation:         mins  65143 ( );  Dry Needling          mins self-pay  Iontophoresis          mins 25754      Timed Treatment:   44   mins   Total Treatment:     56   mins    Cj Wallace, PT  Physical Therapist

## 2022-07-18 ENCOUNTER — TREATMENT (OUTPATIENT)
Dept: PHYSICAL THERAPY | Facility: CLINIC | Age: 57
End: 2022-07-18

## 2022-07-18 DIAGNOSIS — M25.512 CHRONIC LEFT SHOULDER PAIN: ICD-10-CM

## 2022-07-18 DIAGNOSIS — G89.29 CHRONIC LEFT SHOULDER PAIN: ICD-10-CM

## 2022-07-18 DIAGNOSIS — Z98.890 S/P ARTHROSCOPY OF LEFT SHOULDER: Primary | ICD-10-CM

## 2022-07-18 PROCEDURE — 97140 MANUAL THERAPY 1/> REGIONS: CPT | Performed by: PHYSICAL THERAPIST

## 2022-07-18 PROCEDURE — 97530 THERAPEUTIC ACTIVITIES: CPT | Performed by: PHYSICAL THERAPIST

## 2022-07-18 PROCEDURE — 97110 THERAPEUTIC EXERCISES: CPT | Performed by: PHYSICAL THERAPIST

## 2022-07-18 NOTE — PROGRESS NOTES
Physical Therapy Daily Treatment Note      Visit #: 5    Micah Viera reports 7-8/10 pain today at rest.  Pt reports that his shoulder pain has been worse that last week. Pt reports that he has an appointment on Thursday to follow up after surgery with ortho. Pt states that his shoulder is popping and painful with reaching out into abd and reaching overhead. Pt states that his shoulder feels worse now than before surgery.         Objective          Palpation   Left   Tenderness of the levator scapulae and upper trapezius.     Tenderness     Left Shoulder   Tenderness in the biceps tendon (proximal), subscapularis tendon and supraspinatus tendon.     Passive Range of Motion   Left Shoulder   Flexion: 156 degrees   Abduction: 162 degrees   External rotation 90°: 69 degrees   Internal rotation 90°: 64 degrees     Pt present to PT today with no distress at rest.     Procedure and notes reviewed with pt.       See Exercise, Manual, and Modality Logs for complete treatment.     Assessment/Plan  Pt is having worse pain now than during his initial evaluation. The L shoulder is moving well passively with some limitation at end ranges. Pt pain is still the worst in the posterolateral shoulder running into the L deltoid. Pt AROM is limited due to pain and popping. Pt to follow up with ortho and then with PT later this week.       Progress per Plan of Care      Visit Diagnosis:    ICD-10-CM ICD-9-CM   1. S/P arthroscopy of left shoulder  Z98.890 V45.89   2. Chronic left shoulder pain  M25.512 719.41    G89.29 338.29            Manual Therapy:    19     mins  72107;  Therapeutic Exercise:     12    mins  55270;     Neuromuscular Jessee:        mins  59793;    Therapeutic Activity:     10     mins  62794;     Gait Training:           mins  77050;     Ultrasound:          mins  04816;    Electrical Stimulation:         mins  90187 ( );  Dry Needling          mins self-pay  Iontophoresis          mins 67300      Timed  Treatment:   41   mins   Total Treatment:     55   mins    Cj Wallace, PT  Physical Therapist

## 2022-07-21 ENCOUNTER — OFFICE VISIT (OUTPATIENT)
Dept: ORTHOPEDIC SURGERY | Facility: CLINIC | Age: 57
End: 2022-07-21

## 2022-07-21 DIAGNOSIS — M19.012 ARTHRITIS OF LEFT ACROMIOCLAVICULAR JOINT: ICD-10-CM

## 2022-07-21 DIAGNOSIS — M75.02 ADHESIVE CAPSULITIS OF LEFT SHOULDER: ICD-10-CM

## 2022-07-21 DIAGNOSIS — S46.212D RUPTURE OF PROXIMAL BICEPS TENDON, LEFT, SUBSEQUENT ENCOUNTER: ICD-10-CM

## 2022-07-21 DIAGNOSIS — Z98.890 S/P ARTHROSCOPY OF SHOULDER: Primary | ICD-10-CM
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
## 2022-07-21 DIAGNOSIS — M24.112 DEGENERATIVE TEAR OF GLENOID LABRUM, LEFT: ICD-10-CM

## 2022-07-21 PROCEDURE — 99024 POSTOP FOLLOW-UP VISIT: CPT | Performed by: ORTHOPAEDIC SURGERY

## 2022-07-21 RX ORDER — DICLOFENAC SODIUM 75 MG/1
75 TABLET, DELAYED RELEASE ORAL 2 TIMES DAILY
COMMUNITY
Start: 2022-06-24

## 2022-07-21 NOTE — PROGRESS NOTES
[facility] Orthopaedic Surgery Clinic Note        Subjective     Post-op (4 week f/u; 7 weeks s/p left shoulder arthroscopy with subacromial decompression, major debridement of superior labrum anterior to posterior, debridement of the biceps stump, and chondroplasty of the glenoid and humeral head 6/3/22)       DYLAN Viera is a 56 y.o. male.  Patient returns the office today now 7 weeks out firm left shoulder arthroscopy with subacromial decompression and major debridement of superior labrum, biceps stump, and chondroplasty of the glenoid and humeral head on 6/3/2022.  This is a work-related injury.  Patient has been doing his postop therapy with Cj Wallace in [place].  We have been communicating with Cj.  Patient tells me that he has had a lot of difficulty with pain at the top and back of the shoulder.  He is having some popping and cracking as well.  Have a lot of pain with abduction          Objective      Physical Exam  There were no vitals taken for this visit.    There is no height or weight on file to calculate BMI.        Ortho Exam  Musculoskeletal   Upper Extremity   Left Shoulder     Inspection and Palpation:     Medial border scapular tenderness-none    AC Joint Tenderness -moderate    Sensation is normal    Examination reveals no ecchymosis.        Strength and Tone:    Supraspinatus - 5/5 with mild irritation    External Rotators-5/5    Infraspinatus - 5/5    Subscapularis - 5/5    Deltoid - 5/5     Range of Motion      Left Shoulder:    Internal Rotation: ROM -L5-S1    External Rotation: AROM -60 degrees    Elevation through flexion: AROM -150 degrees     Abduction: 90 degrees       Impingement   Left shoulder    Solomon-Brett impingement test positive    Neer impingement test positive     Functional Testing   Left shoulder    AC crossover adduction test positive      Imaging Reviewed:  Imaging Results (Last 24 Hours)     ** No results found for the last 24 hours. **             Assessment    Assessment:  1. S/P arthroscopy of shoulder    2. Work related injury    3. Rupture of proximal biceps tendon, left, subsequent encounter    4. Degenerative tear of glenoid labrum, left    5. Arthritis of left acromioclavicular joint    6. Adhesive capsulitis of left shoulder        Plan:  Postop adhesive capsulitis after left shoulder arthroscopy and now exacerbation of underlying AC joint arthritis--AC joint arthritis is a likely source of his popping and cracking.  I believe the major etiology, however, for his pain is his postoperative stiffness and adhesive capsulitis.  I encouraged him to stretch out the shoulder as much as possible and I will recommend to his therapist to be vigilant about motion restoration as I believe this will significantly alleviate a lot of his discomfort and certainly offload the AC joint as well.  I will see him back in 4 to 6 weeks and if is not a lot better, consider modalities to the AC joint.  Keep him off work in the interim.      Jeremy Fan MD  07/21/22  12:42 EDT      Dictated Utilizing Dragon Dictation.

## 2022-07-21 NOTE — PROGRESS NOTES
Thank you for the update.  Likely source of his popping is coming from the AC joint.  I will let you know what I think after I see him today    Thank you    ANITA

## 2022-07-22 ENCOUNTER — TREATMENT (OUTPATIENT)
Dept: PHYSICAL THERAPY | Facility: CLINIC | Age: 57
End: 2022-07-22

## 2022-07-22 DIAGNOSIS — Z98.890 S/P ARTHROSCOPY OF LEFT SHOULDER: Primary | ICD-10-CM

## 2022-07-22 DIAGNOSIS — G89.29 CHRONIC LEFT SHOULDER PAIN: ICD-10-CM

## 2022-07-22 DIAGNOSIS — M25.512 CHRONIC LEFT SHOULDER PAIN: ICD-10-CM

## 2022-07-22 PROCEDURE — 97014 ELECTRIC STIMULATION THERAPY: CPT | Performed by: PHYSICAL THERAPIST

## 2022-07-22 PROCEDURE — 97110 THERAPEUTIC EXERCISES: CPT | Performed by: PHYSICAL THERAPIST

## 2022-07-22 PROCEDURE — 97140 MANUAL THERAPY 1/> REGIONS: CPT | Performed by: PHYSICAL THERAPIST

## 2022-07-22 PROCEDURE — 97112 NEUROMUSCULAR REEDUCATION: CPT | Performed by: PHYSICAL THERAPIST

## 2022-07-22 NOTE — PROGRESS NOTES
Physical Therapy Daily Treatment Note      Visit #: 6    Micah Viera reports 7/10 pain today at rest.  Pt reports that he saw the surgeon yesterday who explained that everything he is experiencing is okay. Pt states that the surgeon wants to continue to work on motion and especially abduction to make mobility available with active use.         Objective Pt present to PT today with no distress at rest.     Pt with 8/10 pain following manual stretching, mobilization, and PT exercises today.     Pt with relief of some pain to 4-5/10 with estim and ice on the L shoulder.       See Exercise, Manual, and Modality Logs for complete treatment.     Assessment/Plan  Pt continues to have some tightness in the shoulder but did well with activities today to help improve motion and pain with function. Pt to continue with activities in the clinic as tolerated and will continue to work on L shoulder strength, mobility, and function as able.       Progress per Plan of Care      Visit Diagnosis:    ICD-10-CM ICD-9-CM   1. S/P arthroscopy of left shoulder  Z98.890 V45.89   2. Chronic left shoulder pain  M25.512 719.41    G89.29 338.29            Manual Therapy:    18     mins  44019;  Therapeutic Exercise:    10     mins  78381;     Neuromuscular Jessee:    10    mins  93806;    Therapeutic Activity:          mins  18583;     Gait Training:           mins  89136;     Ultrasound:          mins  81578;    Electrical Stimulation:    15     mins  97296 ( );  Dry Needling          mins self-pay  Iontophoresis          mins 28402      Timed Treatment:   53   mins   Total Treatment:     65   mins    Cj Wallace, PT  Physical Therapist

## 2022-07-25 ENCOUNTER — TREATMENT (OUTPATIENT)
Dept: PHYSICAL THERAPY | Facility: CLINIC | Age: 57
End: 2022-07-25

## 2022-07-25 DIAGNOSIS — Z98.890 S/P ARTHROSCOPY OF LEFT SHOULDER: Primary | ICD-10-CM

## 2022-07-25 DIAGNOSIS — G89.29 CHRONIC LEFT SHOULDER PAIN: ICD-10-CM

## 2022-07-25 DIAGNOSIS — M25.512 CHRONIC LEFT SHOULDER PAIN: ICD-10-CM

## 2022-07-25 PROCEDURE — 97014 ELECTRIC STIMULATION THERAPY: CPT | Performed by: PHYSICAL THERAPIST

## 2022-07-25 PROCEDURE — 97140 MANUAL THERAPY 1/> REGIONS: CPT | Performed by: PHYSICAL THERAPIST

## 2022-07-25 PROCEDURE — 97110 THERAPEUTIC EXERCISES: CPT | Performed by: PHYSICAL THERAPIST

## 2022-07-25 NOTE — PROGRESS NOTES
Physical Therapy Daily Treatment Note      Visit #: 7    Micah Viera reports 7/10 pain today at rest.  Pt reports that his shoulder felt really good all day after last session. Pt reports that his pain and tightness came back the next morning. Pt reports that he still has a lot of popping and discomfort in the L shoulder when doing his stretches.         Objective Pt present to PT today with no distress at rest.     Pt with tightness at end ranges of all motions in the L shoulder. Pt with full motion although had to push through some tightness at end range.     Pt with popping in the L shoulder with stretching.     Pt with 5/10 pain in the L shoulder following activities today.       See Exercise, Manual, and Modality Logs for complete treatment.     Assessment/Plan  Pt still has tightness and popping in the L shoulder that has prevented full function and has increased his pain. Pt to continue with PT to help improve L shoulder mobility, strength, and pain free function.       Progress per Plan of Care      Visit Diagnosis:    ICD-10-CM ICD-9-CM   1. S/P arthroscopy of left shoulder  Z98.890 V45.89   2. Chronic left shoulder pain  M25.512 719.41    G89.29 338.29            Manual Therapy:    20     mins  35252;  Therapeutic Exercise:    16     mins  88980;     Neuromuscular Jessee:        mins  43178;    Therapeutic Activity:          mins  63307;     Gait Training:           mins  90862;     Ultrasound:          mins  67468;    Electrical Stimulation:    15     mins  23915 ( );  Dry Needling          mins self-pay  Iontophoresis          mins 07269      Timed Treatment:   51   mins   Total Treatment:     65   mins    Cj Wallace, PT  Physical Therapist

## 2022-07-28 ENCOUNTER — TREATMENT (OUTPATIENT)
Dept: PHYSICAL THERAPY | Facility: CLINIC | Age: 57
End: 2022-07-28

## 2022-07-28 DIAGNOSIS — M25.512 CHRONIC LEFT SHOULDER PAIN: ICD-10-CM

## 2022-07-28 DIAGNOSIS — G89.29 CHRONIC LEFT SHOULDER PAIN: ICD-10-CM

## 2022-07-28 DIAGNOSIS — Z98.890 S/P ARTHROSCOPY OF LEFT SHOULDER: Primary | ICD-10-CM

## 2022-07-28 PROCEDURE — 97110 THERAPEUTIC EXERCISES: CPT | Performed by: PHYSICAL THERAPIST

## 2022-07-28 PROCEDURE — 97014 ELECTRIC STIMULATION THERAPY: CPT | Performed by: PHYSICAL THERAPIST

## 2022-07-28 PROCEDURE — 97140 MANUAL THERAPY 1/> REGIONS: CPT | Performed by: PHYSICAL THERAPIST

## 2022-07-28 NOTE — PROGRESS NOTES
Physical Therapy Daily Treatment Note      Visit #: 8    Micah Viera reports 6/10 pain today at rest.  Pt reports that his shoulder is hurting today but the stem last session helped a lot. Pt reports that he got a day or 2 of relief.         Objective Pt present to PT today with no distress at rest.     Pt with popping and pain in the L shoulder with abduction and ER passively.     Pt tolerated all other activities well today without increased pain.       See Exercise, Manual, and Modality Logs for complete treatment.     Assessment/Plan  Pt continues to have good motion in the L shoulder although is having a lot of popping and pain still. Pt to continue with stretching and strengthening as tolerated to help improve L shoulder function.       Progress per Plan of Care      Visit Diagnosis:    ICD-10-CM ICD-9-CM   1. S/P arthroscopy of left shoulder  Z98.890 V45.89   2. Chronic left shoulder pain  M25.512 719.41    G89.29 338.29            Manual Therapy:    16     mins  83490;  Therapeutic Exercise:     14    mins  37357;     Neuromuscular Jessee:        mins  95300;    Therapeutic Activity:          mins  34960;     Gait Training:           mins  09791;     Ultrasound:          mins  52810;    Electrical Stimulation:    20     mins  91688 ( );  Dry Needling          mins self-pay  Iontophoresis          mins 50021      Timed Treatment:   50   mins   Total Treatment:     71   mins    Cj Wallace, PT  Physical Therapist

## 2022-08-01 ENCOUNTER — TREATMENT (OUTPATIENT)
Dept: PHYSICAL THERAPY | Facility: CLINIC | Age: 57
End: 2022-08-01

## 2022-08-01 DIAGNOSIS — Z98.890 S/P ARTHROSCOPY OF LEFT SHOULDER: Primary | ICD-10-CM

## 2022-08-01 DIAGNOSIS — M25.512 CHRONIC LEFT SHOULDER PAIN: ICD-10-CM

## 2022-08-01 DIAGNOSIS — G89.29 CHRONIC LEFT SHOULDER PAIN: ICD-10-CM

## 2022-08-01 PROCEDURE — 97110 THERAPEUTIC EXERCISES: CPT | Performed by: PHYSICAL THERAPIST

## 2022-08-01 PROCEDURE — 97014 ELECTRIC STIMULATION THERAPY: CPT | Performed by: PHYSICAL THERAPIST

## 2022-08-01 PROCEDURE — 97140 MANUAL THERAPY 1/> REGIONS: CPT | Performed by: PHYSICAL THERAPIST

## 2022-08-01 PROCEDURE — 97112 NEUROMUSCULAR REEDUCATION: CPT | Performed by: PHYSICAL THERAPIST

## 2022-08-01 NOTE — PROGRESS NOTES
Physical Therapy Daily Treatment Note      Visit #: 9    Micah Viera reports 6/10 pain today at rest.  Pt reports that his L shoulder feels really tight this morning. Pt states that his pain is around the AC joint down into the L deltoid. Pt reports that he did his HEP which felt tight as well.         Objective Pt present to PT today with no distress at rest.     Pt with increased tightness in the shoulder with flexion and abduction compared to last year.     Pt with tenderness in the L UT, levator scap, and infraspinatus with palpation.     Pt motion improved with stretching and mobilization of the GHJ today.       See Exercise, Manual, and Modality Logs for complete treatment.     Assessment/Plan  Pt continues to have pain and popping in the L shoulder. Pt is continuing to stretch and work the shoulder at home and is doing well in the clinic. Pt is going to look into getting a TENS unit for home use as it has helped at the clinic. Pt to continue with PT as tolerated.       Progress per Plan of Care      Visit Diagnosis:    ICD-10-CM ICD-9-CM   1. S/P arthroscopy of left shoulder  Z98.890 V45.89   2. Chronic left shoulder pain  M25.512 719.41    G89.29 338.29            Manual Therapy:    17     mins  79417;  Therapeutic Exercise:    14     mins  25650;     Neuromuscular Jessee:    12    mins  74254;    Therapeutic Activity:          mins  00780;     Gait Training:           mins  70827;     Ultrasound:          mins  34610;    Electrical Stimulation:    20     mins  31506 ( );  Dry Needling          mins self-pay  Iontophoresis          mins 50377      Timed Treatment:   63   mins   Total Treatment:     67   mins    Cj Wallace, PT  Physical Therapist

## 2022-08-02 RX ORDER — SENNOSIDES 8.6 MG
CAPSULE ORAL
Qty: 30 TABLET | Refills: 0 | OUTPATIENT
Start: 2022-08-02

## 2022-08-04 ENCOUNTER — TREATMENT (OUTPATIENT)
Dept: PHYSICAL THERAPY | Facility: CLINIC | Age: 57
End: 2022-08-04

## 2022-08-04 DIAGNOSIS — M25.512 CHRONIC LEFT SHOULDER PAIN: ICD-10-CM

## 2022-08-04 DIAGNOSIS — G89.29 CHRONIC LEFT SHOULDER PAIN: ICD-10-CM

## 2022-08-04 DIAGNOSIS — Z98.890 S/P ARTHROSCOPY OF LEFT SHOULDER: Primary | ICD-10-CM

## 2022-08-04 PROCEDURE — 97014 ELECTRIC STIMULATION THERAPY: CPT | Performed by: PHYSICAL THERAPIST

## 2022-08-04 PROCEDURE — 97110 THERAPEUTIC EXERCISES: CPT | Performed by: PHYSICAL THERAPIST

## 2022-08-04 PROCEDURE — 97140 MANUAL THERAPY 1/> REGIONS: CPT | Performed by: PHYSICAL THERAPIST

## 2022-08-04 NOTE — PROGRESS NOTES
Physical Therapy Daily Treatment Note      Visit #: 10    Micah Viera reports 7/10 pain today at rest.  Pt reports that his shoulder has been very tight and painful. Pt states that he has a lot of pain in the side of his L shoulder and in the AC joint.         Objective Pt present to PT today with no distress at rest.     Pt with tenderness all around the AC joint and subacromial area in the RC tendons.     Pt with improved motion and less tightness with stretching of the L shoulder.     Pt with guarding noted in the L shoulder with protracted scapula.       See Exercise, Manual, and Modality Logs for complete treatment.     Assessment/Plan  Pt continues to have pain in the L shoulder that is very limiting with function and activity tolerance. Pt still moving well and had full PROM today following some stretching and mobilization. Pt to continue with PT as tolerated. Pt and PT discussed the possible benefits of getting a steroid injection in the L shoulder.       Progress per Plan of Care      Visit Diagnosis:    ICD-10-CM ICD-9-CM   1. S/P arthroscopy of left shoulder  Z98.890 V45.89   2. Chronic left shoulder pain  M25.512 719.41    G89.29 338.29            Manual Therapy:    25     mins  30114;  Therapeutic Exercise:    14     mins  12226;     Neuromuscular Jessee:        mins  20430;    Therapeutic Activity:          mins  72644;     Gait Training:           mins  96840;     Ultrasound:          mins  73277;    Electrical Stimulation:   20      mins  20311 ( );  Dry Needling          mins self-pay  Iontophoresis          mins 29836      Timed Treatment:   59   mins   Total Treatment:     64   mins    Cj Wallace, PT  Physical Therapist

## 2022-08-09 ENCOUNTER — TELEPHONE (OUTPATIENT)
Dept: ORTHOPEDIC SURGERY | Facility: CLINIC | Age: 57
End: 2022-08-09

## 2022-08-09 ENCOUNTER — TREATMENT (OUTPATIENT)
Dept: PHYSICAL THERAPY | Facility: CLINIC | Age: 57
End: 2022-08-09

## 2022-08-09 DIAGNOSIS — G89.29 CHRONIC LEFT SHOULDER PAIN: ICD-10-CM

## 2022-08-09 DIAGNOSIS — M25.512 CHRONIC LEFT SHOULDER PAIN: ICD-10-CM

## 2022-08-09 DIAGNOSIS — Z98.890 S/P ARTHROSCOPY OF LEFT SHOULDER: Primary | ICD-10-CM

## 2022-08-09 PROCEDURE — 97140 MANUAL THERAPY 1/> REGIONS: CPT | Performed by: PHYSICAL THERAPIST

## 2022-08-09 PROCEDURE — 97112 NEUROMUSCULAR REEDUCATION: CPT | Performed by: PHYSICAL THERAPIST

## 2022-08-09 PROCEDURE — 97014 ELECTRIC STIMULATION THERAPY: CPT | Performed by: PHYSICAL THERAPIST

## 2022-08-09 PROCEDURE — 97110 THERAPEUTIC EXERCISES: CPT | Performed by: PHYSICAL THERAPIST

## 2022-08-09 RX ORDER — SENNOSIDES 8.6 MG
650 CAPSULE ORAL EVERY 8 HOURS PRN
Qty: 30 TABLET | Refills: 1 | Status: SHIPPED | OUTPATIENT
Start: 2022-08-09

## 2022-08-09 NOTE — PROGRESS NOTES
Physical Therapy Daily Treatment Note      Visit #: 11    Micah Viera reports 6/10 pain today at rest.  Pt reports that the lateral shoulder is really bothering him today and has pain down into the lateral deltoid when moving and using the shoulder. Pt states that he has not felt much improvement.         Objective Pt present to PT today with no distress at rest.     Pt with tenderness in the supraspinatus tendon and subacromial space.     Pt with tightness at end ranges of L shoulder motion although no capsular tightness noted. Pt very guarded with catches and pain with PROM.     Scraping performed on L shoulder today.       See Exercise, Manual, and Modality Logs for complete treatment.     Assessment/Plan  Pt continues to have pain in the L shoulder limiting function and activity tolerance. Pt to continue with PT to help maintain motion and progress with strengthening and stabilization activities for the shoulder.       Progress per Plan of Care      Visit Diagnosis:    ICD-10-CM ICD-9-CM   1. S/P arthroscopy of left shoulder  Z98.890 V45.89   2. Chronic left shoulder pain  M25.512 719.41    G89.29 338.29            Manual Therapy:    14     mins  83777;  Therapeutic Exercise:    12     mins  60490;     Neuromuscular Jessee:    10    mins  59189;    Therapeutic Activity:          mins  76238;     Gait Training:           mins  30147;     Ultrasound:          mins  57316;    Electrical Stimulation:    20     mins  26931 ( );  Dry Needling          mins self-pay  Iontophoresis          mins 39222      Timed Treatment:   56   mins   Total Treatment:     65   mins    Cj Wallace, PT  Physical Therapist

## 2022-08-09 NOTE — TELEPHONE ENCOUNTER
Tiffanie,    Please see below and advise. Patient is 9 weeks s/p left shoulder arthroscopy for Dr. Fan.        Thank you,    Melody OSORIO(R)

## 2022-08-09 NOTE — TELEPHONE ENCOUNTER
Caller: JOVANI THORNTON      Relationship: SELF     Best call back number:     Requested Prescriptions:   Requested Prescriptions      No prescriptions requested or ordered in this encounter    acetaminophen (TYLENOL) 650 MG        Pharmacy where request should be sent: [id number] - [place], [place] - [facility] [address] AT [facility] [address]          Does the patient have less than a 3 day supply:  [x] Yes  [] No    Andrew Flores Rep   08/09/22 10:46 EDT

## 2022-08-11 ENCOUNTER — TREATMENT (OUTPATIENT)
Dept: PHYSICAL THERAPY | Facility: CLINIC | Age: 57
End: 2022-08-11

## 2022-08-11 DIAGNOSIS — M25.512 CHRONIC LEFT SHOULDER PAIN: ICD-10-CM

## 2022-08-11 DIAGNOSIS — G89.29 CHRONIC LEFT SHOULDER PAIN: ICD-10-CM

## 2022-08-11 DIAGNOSIS — Z98.890 S/P ARTHROSCOPY OF LEFT SHOULDER: Primary | ICD-10-CM

## 2022-08-11 PROCEDURE — 97112 NEUROMUSCULAR REEDUCATION: CPT | Performed by: PHYSICAL THERAPIST

## 2022-08-11 PROCEDURE — 97140 MANUAL THERAPY 1/> REGIONS: CPT | Performed by: PHYSICAL THERAPIST

## 2022-08-11 PROCEDURE — 97110 THERAPEUTIC EXERCISES: CPT | Performed by: PHYSICAL THERAPIST

## 2022-08-11 NOTE — PROGRESS NOTES
Physical Therapy Daily Treatment Note      Visit #: 12    Micah Viera reports 7/10 pain today at rest.  Pt reports that his shoulder feels good for about 4-5 hours after PT sessions and then tightens up. Pt reports that he is going to take the Cortizone shot next week.         Objective Pt present to PT today with no distress at rest.     Pt with no increased pain with activities in the clinic today.     Pt with decreased pain and tightness in the L shoulder following activities today. Estim held.       See Exercise, Manual, and Modality Logs for complete treatment.     Assessment/Plan  Pt continues to have pain in the L shoulder with popping and catching. Pt still has weakness due to pain and decreased motion and tightness in the in the L shoulder. Pt to continue with PT to help improve L shoulder strength, mobility, and pain free function.       Progress per Plan of Care      Visit Diagnosis:    ICD-10-CM ICD-9-CM   1. S/P arthroscopy of left shoulder  Z98.890 V45.89   2. Chronic left shoulder pain  M25.512 719.41    G89.29 338.29            Manual Therapy:    18     mins  44981;  Therapeutic Exercise:    24     mins  32582;     Neuromuscular Jessee:    10    mins  17204;    Therapeutic Activity:          mins  29616;     Gait Training:           mins  40623;     Ultrasound:          mins  35687;    Electrical Stimulation:         mins  49869 ( );  Dry Needling          mins self-pay  Iontophoresis          mins 74538      Timed Treatment:   52   mins   Total Treatment:     64   mins    Cj Wallace, PT  Physical Therapist

## 2022-08-16 ENCOUNTER — TREATMENT (OUTPATIENT)
Dept: PHYSICAL THERAPY | Facility: CLINIC | Age: 57
End: 2022-08-16

## 2022-08-16 DIAGNOSIS — M25.512 CHRONIC LEFT SHOULDER PAIN: ICD-10-CM

## 2022-08-16 DIAGNOSIS — Z98.890 S/P ARTHROSCOPY OF LEFT SHOULDER: Primary | ICD-10-CM

## 2022-08-16 DIAGNOSIS — G89.29 CHRONIC LEFT SHOULDER PAIN: ICD-10-CM

## 2022-08-16 PROCEDURE — 97110 THERAPEUTIC EXERCISES: CPT | Performed by: PHYSICAL THERAPIST

## 2022-08-16 PROCEDURE — 97112 NEUROMUSCULAR REEDUCATION: CPT | Performed by: PHYSICAL THERAPIST

## 2022-08-16 PROCEDURE — 97140 MANUAL THERAPY 1/> REGIONS: CPT | Performed by: PHYSICAL THERAPIST

## 2022-08-16 NOTE — PROGRESS NOTES
Physical Therapy Daily Treatment Note      Visit #: 13    Micah Viera reports 6/10 pain today at rest.  Pt reports that he had about 5-6 hours of relief after PT and then his shoulder tightened up again. Pt states that he has tightness and pain today in the L shoulder today.         Objective          Passive Range of Motion   Left Shoulder   Flexion: 169 degrees   Abduction: 171 degrees   External rotation 90°: 85 degrees   Internal rotation 90°: 70 degrees     Pt present to PT today with no distress at rest.     Pt with minimal limitation in the L shoulder due to stiffness and with pain at end ranges.     Pt with popping and clicking in the shoulder with PROM although none with wall slides today.       See Exercise, Manual, and Modality Logs for complete treatment.     Assessment/Plan  Pt continues to have a lot of pain and tightness in the L shoulder. Pt has been instructed to perform exercises within comfortable ranges to avoid increased irritation. Pt is following up with surgeon on Thursday and is hoping to get a steroid injection. Pt to follow up with PT later this week and will continue as tolerated.       Progress per Plan of Care      Visit Diagnosis:    ICD-10-CM ICD-9-CM   1. S/P arthroscopy of left shoulder  Z98.890 V45.89   2. Chronic left shoulder pain  M25.512 719.41    G89.29 338.29            Manual Therapy:    13     mins  58008;  Therapeutic Exercise:    26     mins  87530;     Neuromuscular Jessee:    15    mins  88295;    Therapeutic Activity:          mins  02233;     Gait Training:           mins  75078;     Ultrasound:          mins  66388;    Electrical Stimulation:         mins  21499 ( );  Dry Needling          mins self-pay  Iontophoresis          mins 33527      Timed Treatment:   54   mins   Total Treatment:     55   mins    Cj Wallace, PT  Physical Therapist

## 2022-08-18 ENCOUNTER — OFFICE VISIT (OUTPATIENT)
Dept: ORTHOPEDIC SURGERY | Facility: CLINIC | Age: 57
End: 2022-08-18
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
## 2022-08-18 DIAGNOSIS — M75.02 ADHESIVE CAPSULITIS OF LEFT SHOULDER: ICD-10-CM

## 2022-08-18 DIAGNOSIS — Z98.890 S/P ARTHROSCOPY OF SHOULDER: Primary | ICD-10-CM

## 2022-08-18 DIAGNOSIS — M19.012 ARTHRITIS OF LEFT ACROMIOCLAVICULAR JOINT: ICD-10-CM

## 2022-08-18 PROCEDURE — 99024 POSTOP FOLLOW-UP VISIT: CPT | Performed by: ORTHOPAEDIC SURGERY

## 2022-08-18 RX ORDER — LIDOCAINE HYDROCHLORIDE 10 MG/ML
3 INJECTION, SOLUTION EPIDURAL; INFILTRATION; INTRACAUDAL; PERINEURAL
Status: COMPLETED | OUTPATIENT
Start: 2022-08-18 | End: 2022-08-18

## 2022-08-18 RX ORDER — TRIAMCINOLONE ACETONIDE 40 MG/ML
40 INJECTION, SUSPENSION INTRA-ARTICULAR; INTRAMUSCULAR
Status: COMPLETED | OUTPATIENT
Start: 2022-08-18 | End: 2022-08-18

## 2022-08-18 RX ADMIN — LIDOCAINE HYDROCHLORIDE 3 ML: 10 INJECTION, SOLUTION EPIDURAL; INFILTRATION; INTRACAUDAL; PERINEURAL at 12:04

## 2022-08-18 RX ADMIN — TRIAMCINOLONE ACETONIDE 40 MG: 40 INJECTION, SUSPENSION INTRA-ARTICULAR; INTRAMUSCULAR at 12:04

## 2022-08-18 NOTE — PROGRESS NOTES
Procedure   - Large Joint Arthrocentesis: L glenohumeral on 8/18/2022 12:04 PM  Indications: pain  Details: 25 G needle, anterior approach  Medications: 3 mL lidocaine PF 1% 1 %; 40 mg triamcinolone acetonide 40 MG/ML  Outcome: tolerated well, no immediate complications  Procedure, treatment alternatives, risks and benefits explained, specific risks discussed. Consent was given by the patient. Immediately prior to procedure a time out was called to verify the correct patient, procedure, equipment, support staff and site/side marked as required. Patient was prepped and draped in the usual sterile fashion.

## 2022-08-18 NOTE — PROGRESS NOTES
[facility] [place] [facility] Note        Subjective     Post-op (4 week follow up -- 10 week s/p left shoulder arthroscopy with subacromial decompression, major debridement of superior labrum anterior to posterior, debridement of the biceps stump, and chondroplasty of the glenoid and humeral head 6/3/22)       DYLAN Viera is a 56 y.o. male.  Patient returns the office today for follow-up after left shoulder arthroscopy with subacromial decompression and debridement of the labrum, biceps stump, and chondroplasty of the glenoid humeral head on 6/3/2022.  Patient has been doing physical therapy.  Continues to have popping and cracking the shoulder and pain with abduction.  He has trouble reaching behind him.          Objective      Physical Exam  There were no vitals taken for this visit.    There is no height or weight on file to calculate BMI.        Ortho Exam  Musculoskeletal   Upper Extremity   Left Shoulder     Inspection and Palpation:     Medial border scapular tenderness-none    AC Joint Tenderness -none    Sensation is normal    Examination reveals no ecchymosis.        Strength and Tone:    Supraspinatus - 5/5    External Rotators-5/5    Infraspinatus - 5/5    Subscapularis - 5/5    Deltoid - 5/5     Range of Motion      Left Shoulder:    Internal Rotation: ROM -hip pocket    External Rotation: AROM -60 degrees    Elevation through flexion: AROM -125 degrees     Abduction: 90 degrees       Impingement   Left shoulder    Solomon-Brett impingement test positive    Neer impingement test positive     Functional Testing   Left shoulder    AC crossover adduction test positive      Imaging Reviewed:  Imaging Results (Last 24 Hours)     ** No results found for the last 24 hours. **            Assessment    Assessment:  1. S/P arthroscopy of shoulder    2. Work related injury    3. Arthritis of left acromioclavicular joint    4. Adhesive capsulitis of left shoulder        Plan:  Status post  arthroscopy of the shoulder with postop adhesive capsulitis.  Work-related injury.  AC joint is not irritable today.  The plan will be for diagnostic and repeat injection into the left glenohumeral joint to see if we can help loosen up the patient's shoulder.  He will continue physical therapy.  He will stay off work.  I will see him back in a month if is not a lot better, the AC joint will be injected for diagnostic and therapeutic purposes.    Procedure Note:    I discussed with the patient the potential benefits of performing a therapeutic injections as well as potential risks including but not limited to infection, swelling, pain, bleeding, bruising, nerve/vessel damage, skin color changes, transient elevation in blood glucose levels, and fat atrophy. After informed consent and after the areas were prepped with chlorhexadine soap, ethyl chloride was used to numb the skin. Via the anterior approach, 3mL of 1% lidocaine followed by 40mg of Kenalog were each injected into the glenohumeral joint of the left shoulder. The patient tolerated the procedure well. There were no complications. A sterile dressing was placed over the injection sites.        Jeremy Fan MD  08/18/22  13:30 EDT      Dictated Utilizing Dragon Dictation.

## 2022-08-19 ENCOUNTER — TELEPHONE (OUTPATIENT)
Dept: PHYSICAL THERAPY | Facility: CLINIC | Age: 57
End: 2022-08-19

## 2022-08-23 ENCOUNTER — TREATMENT (OUTPATIENT)
Dept: PHYSICAL THERAPY | Facility: CLINIC | Age: 57
End: 2022-08-23

## 2022-08-23 DIAGNOSIS — M25.512 CHRONIC LEFT SHOULDER PAIN: ICD-10-CM

## 2022-08-23 DIAGNOSIS — Z98.890 S/P ARTHROSCOPY OF LEFT SHOULDER: Primary | ICD-10-CM

## 2022-08-23 DIAGNOSIS — G89.29 CHRONIC LEFT SHOULDER PAIN: ICD-10-CM

## 2022-08-23 PROCEDURE — 97014 ELECTRIC STIMULATION THERAPY: CPT | Performed by: PHYSICAL THERAPIST

## 2022-08-23 PROCEDURE — 97110 THERAPEUTIC EXERCISES: CPT | Performed by: PHYSICAL THERAPIST

## 2022-08-23 PROCEDURE — 97140 MANUAL THERAPY 1/> REGIONS: CPT | Performed by: PHYSICAL THERAPIST

## 2022-08-23 NOTE — PROGRESS NOTES
Physical Therapy Daily Treatment Note      Visit #: 14    Micah Viera reports 5-6/10 pain today at rest.  Pt reports that he had 8-9/10 pain for 2--3 days following his injection in the L shoulder. Pt reports that it has started to ease off and he feels like he can move better actively and with PROM.         Objective Pt present to PT today with no distress at rest.     Pt with improve flexion in the L shoulder without pain. Pt still has some pain with abduction and IR although improved motion and comfort overall with stretching.     Pt with no increased pain in the L shoulder with activities today.     Pt reminded to avoid bringing elbows too far back with rows today.       See Exercise, Manual, and Modality Logs for complete treatment.     Assessment/Plan  Pt continues to do well with activities in the clinic to help improve L shoulder mobility and pain. Pt has done well with the L shoulder injection although still sore from the injection as well. Pt to follow up later this week to continue with activities as tolerated to help improve L shoulder pain and motion.       Progress per Plan of Care      Visit Diagnosis:    ICD-10-CM ICD-9-CM   1. S/P arthroscopy of left shoulder  Z98.890 V45.89   2. Chronic left shoulder pain  M25.512 719.41    G89.29 338.29            Manual Therapy:    17     mins  84969;  Therapeutic Exercise:    10     mins  58325;     Neuromuscular Jessee:        mins  87821;    Therapeutic Activity:          mins  63819;     Gait Training:           mins  85393;     Ultrasound:          mins  65520;    Electrical Stimulation:    15     mins  99158 ( );  Dry Needling          mins self-pay  Iontophoresis          mins 42527      Timed Treatment:   42   mins   Total Treatment:     70   mins    Cj Wallace, PT  Physical Therapist

## 2022-08-25 ENCOUNTER — TREATMENT (OUTPATIENT)
Dept: PHYSICAL THERAPY | Facility: CLINIC | Age: 57
End: 2022-08-25

## 2022-08-25 DIAGNOSIS — M25.512 CHRONIC LEFT SHOULDER PAIN: ICD-10-CM

## 2022-08-25 DIAGNOSIS — Z98.890 S/P ARTHROSCOPY OF LEFT SHOULDER: Primary | ICD-10-CM

## 2022-08-25 DIAGNOSIS — G89.29 CHRONIC LEFT SHOULDER PAIN: ICD-10-CM

## 2022-08-25 PROCEDURE — 97110 THERAPEUTIC EXERCISES: CPT | Performed by: PHYSICAL THERAPIST

## 2022-08-25 PROCEDURE — 97014 ELECTRIC STIMULATION THERAPY: CPT | Performed by: PHYSICAL THERAPIST

## 2022-08-25 PROCEDURE — 97112 NEUROMUSCULAR REEDUCATION: CPT | Performed by: PHYSICAL THERAPIST

## 2022-08-25 PROCEDURE — 97140 MANUAL THERAPY 1/> REGIONS: CPT | Performed by: PHYSICAL THERAPIST

## 2022-08-25 NOTE — PROGRESS NOTES
Physical Therapy Daily Treatment Note      Visit #: 15    Micah Viera reports 3/10 pain today at rest.  Pt reports that the soreness has eased off after the shot and his L shoulder feels better now than it has since his injury or his surgery. Pt reports that he finally is seeing some improvement.         Objective Pt present to PT today with no distress at rest.     Pt with tenderness in the infraspinatus and long head of the biceps of the L shoulder.     Pt with 3/10 pain following activities and exercises prior to ice and estim.       See Exercise, Manual, and Modality Logs for complete treatment.     Assessment/Plan  Pt continues to have less pain and improved PROM and AROM of the L shoulder. Pt is doing well with activities in the clinic and will continue as tolerated to help improve L shoulder mobility, activity tolerance, and function. Pt is following up with the surgeon again on 9/15 according to pt and will hopefully be cleared to progress with strengthening.       Progress per Plan of Care      Visit Diagnosis:    ICD-10-CM ICD-9-CM   1. S/P arthroscopy of left shoulder  Z98.890 V45.89   2. Chronic left shoulder pain  M25.512 719.41    G89.29 338.29            Manual Therapy:    16     mins  83386;  Therapeutic Exercise:    22     mins  86999;     Neuromuscular Jessee:    10    mins  67959;    Therapeutic Activity:          mins  10029;     Gait Training:           mins  97731;     Ultrasound:          mins  10748;    Electrical Stimulation:    15     mins  13432 ( );  Dry Needling          mins self-pay  Iontophoresis          mins 53615      Timed Treatment:   63   mins   Total Treatment:     68   mins    Cj Wallace, PT  Physical Therapist         No

## 2022-08-29 ENCOUNTER — TREATMENT (OUTPATIENT)
Dept: PHYSICAL THERAPY | Facility: CLINIC | Age: 57
End: 2022-08-29

## 2022-08-29 DIAGNOSIS — M25.512 CHRONIC LEFT SHOULDER PAIN: ICD-10-CM

## 2022-08-29 DIAGNOSIS — Z98.890 S/P ARTHROSCOPY OF LEFT SHOULDER: Primary | ICD-10-CM

## 2022-08-29 DIAGNOSIS — G89.29 CHRONIC LEFT SHOULDER PAIN: ICD-10-CM

## 2022-08-29 PROCEDURE — 97140 MANUAL THERAPY 1/> REGIONS: CPT | Performed by: PHYSICAL THERAPIST

## 2022-08-29 PROCEDURE — 97110 THERAPEUTIC EXERCISES: CPT | Performed by: PHYSICAL THERAPIST

## 2022-08-29 PROCEDURE — 97112 NEUROMUSCULAR REEDUCATION: CPT | Performed by: PHYSICAL THERAPIST

## 2022-08-30 NOTE — PROGRESS NOTES
Physical Therapy Daily Treatment Note      Visit #: 16    Micah Viera reports 3/10 pain today at rest.  Pt reports that he is still feeling pretty good and much better than he did before his injection. Pt states that he still has pain when moving certain ways.         Objective Pt present to PT today with no distress at rest.     Pt with good motion and improve ER motion noted today.     Pt with less guarding noted in the L shoulder with motion today and less popping.     Pt with no increased pain in the L shoulder with activities in the clinic and exercises today.       See Exercise, Manual, and Modality Logs for complete treatment.     Assessment/Plan  Pt continues to have some pain in the L shoulder although is much less painful than the last couple of weeks. Pt to continue with mobility and AROM activities until he returns to his surgeon and will progress with strengthening when cleared to perform resistance activities.       Progress per Plan of Care      Visit Diagnosis:    ICD-10-CM ICD-9-CM   1. S/P arthroscopy of left shoulder  Z98.890 V45.89   2. Chronic left shoulder pain  M25.512 719.41    G89.29 338.29            Manual Therapy:    14     mins  66439;  Therapeutic Exercise:    24     mins  53385;     Neuromuscular Jessee:    15    mins  05271;    Therapeutic Activity:          mins  75043;     Gait Training:           mins  77970;     Ultrasound:          mins  50029;    Electrical Stimulation:         mins  01377 ( );  Dry Needling          mins self-pay  Iontophoresis          mins 39184      Timed Treatment:   53   mins   Total Treatment:     57   mins    Cj Wallace, PT  Physical Therapist

## 2022-09-06 ENCOUNTER — TREATMENT (OUTPATIENT)
Dept: PHYSICAL THERAPY | Facility: CLINIC | Age: 57
End: 2022-09-06

## 2022-09-06 DIAGNOSIS — M25.512 CHRONIC LEFT SHOULDER PAIN: ICD-10-CM

## 2022-09-06 DIAGNOSIS — Z98.890 S/P ARTHROSCOPY OF LEFT SHOULDER: Primary | ICD-10-CM

## 2022-09-06 DIAGNOSIS — G89.29 CHRONIC LEFT SHOULDER PAIN: ICD-10-CM

## 2022-09-06 PROCEDURE — 97140 MANUAL THERAPY 1/> REGIONS: CPT | Performed by: PHYSICAL THERAPIST

## 2022-09-06 PROCEDURE — 97112 NEUROMUSCULAR REEDUCATION: CPT | Performed by: PHYSICAL THERAPIST

## 2022-09-06 PROCEDURE — 97110 THERAPEUTIC EXERCISES: CPT | Performed by: PHYSICAL THERAPIST

## 2022-09-06 NOTE — PROGRESS NOTES
Physical Therapy Daily Treatment Note      Visit #: 17    Micah Viera reports 3/10 pain today at rest.  Pt reports that he did some lifting when camping this weekend moving coolers and boxes. Pt states that his shoulder pain got up to 6-7/10 but settled down back to 3/10 with ice and rest.          Objective Pt present to PT today with no distress at rest.     Pt with tenderness in the L biceps and anterior shoulder with hypertonecity noted.     Pt with no increased pain in the L shoulder with activities today.       See Exercise, Manual, and Modality Logs for complete treatment.     Assessment/Plan  Pt continues to have good motion in the L shoulder with stretching and mobility exercises. Pt is going to the surgeon soon and will hopefully be cleared to perform resistance and strengthening exercises. Pt to continue with PT as tolerated to help improve L shoulder mobility, activity tolerance, and function.       Progress per Plan of Care      Visit Diagnosis:    ICD-10-CM ICD-9-CM   1. S/P arthroscopy of left shoulder  Z98.890 V45.89   2. Chronic left shoulder pain  M25.512 719.41    G89.29 338.29            Manual Therapy:    16     mins  02980;  Therapeutic Exercise:    12     mins  99753;     Neuromuscular Jessee:    12    mins  94458;    Therapeutic Activity:          mins  55423;     Gait Training:           mins  28899;     Ultrasound:          mins  54035;    Electrical Stimulation:         mins  60844 ( );  Dry Needling          mins self-pay  Iontophoresis          mins 72284      Timed Treatment:   40   mins   Total Treatment:     56   mins    Cj Wallace, PT  Physical Therapist

## 2022-09-08 ENCOUNTER — TREATMENT (OUTPATIENT)
Dept: PHYSICAL THERAPY | Facility: CLINIC | Age: 57
End: 2022-09-08

## 2022-09-08 DIAGNOSIS — G89.29 CHRONIC LEFT SHOULDER PAIN: Primary | ICD-10-CM

## 2022-09-08 DIAGNOSIS — M25.512 CHRONIC LEFT SHOULDER PAIN: Primary | ICD-10-CM

## 2022-09-08 PROCEDURE — 97112 NEUROMUSCULAR REEDUCATION: CPT | Performed by: PHYSICAL THERAPIST

## 2022-09-08 PROCEDURE — 97140 MANUAL THERAPY 1/> REGIONS: CPT | Performed by: PHYSICAL THERAPIST

## 2022-09-08 NOTE — PROGRESS NOTES
Physical Therapy Daily Treatment Note      Visit #: 18    Micah Viera reports 3/10 pain today at rest.  Pt reports that his shoulder feels about the same but still has the front flared up some in the L shoulder. Pt states that the shoulder seems a little less irritable.         Objective Pt present to PT today with no distress at rest.     Pt with good motion in the L shoulder with PROM and did well with wall slides today.     Pt with no increased pain in the L shoulder today with activities in the clinic.       See Exercise, Manual, and Modality Logs for complete treatment.     Assessment/Plan  Pt continues to have pain in the L shoulder that has been less but steady since his steroid shot. Pt to continue with PT to maintain motion and function and will progress with activities as tolerated and as allowed by surgeon to help improve L shoulder strength, mobility, and function.       Progress per Plan of Care      Visit Diagnosis:    ICD-10-CM ICD-9-CM   1. Chronic left shoulder pain  M25.512 719.41    G89.29 338.29            Manual Therapy:    14     mins  37820;  Therapeutic Exercise:         mins  07499;     Neuromuscular Jessee:    12    mins  40573;    Therapeutic Activity:          mins  64889;     Gait Training:           mins  83285;     Ultrasound:          mins  26161;    Electrical Stimulation:         mins  17330 ( );  Dry Needling          mins self-pay  Iontophoresis          mins 90252      Timed Treatment:   26   mins   Total Treatment:     55   mins    Cj Wallace, PT  Physical Therapist

## 2022-09-13 ENCOUNTER — TREATMENT (OUTPATIENT)
Dept: PHYSICAL THERAPY | Facility: CLINIC | Age: 57
End: 2022-09-13

## 2022-09-13 DIAGNOSIS — G89.29 CHRONIC LEFT SHOULDER PAIN: Primary | ICD-10-CM

## 2022-09-13 DIAGNOSIS — Z98.890 S/P ARTHROSCOPY OF LEFT SHOULDER: ICD-10-CM

## 2022-09-13 DIAGNOSIS — M25.512 CHRONIC LEFT SHOULDER PAIN: Primary | ICD-10-CM

## 2022-09-13 PROCEDURE — 97112 NEUROMUSCULAR REEDUCATION: CPT | Performed by: PHYSICAL THERAPIST

## 2022-09-13 PROCEDURE — 97110 THERAPEUTIC EXERCISES: CPT | Performed by: PHYSICAL THERAPIST

## 2022-09-13 PROCEDURE — 97140 MANUAL THERAPY 1/> REGIONS: CPT | Performed by: PHYSICAL THERAPIST

## 2022-09-13 NOTE — PROGRESS NOTES
Physical Therapy Daily Treatment Note      Visit #: 19    Micah Viera reports 3/10 pain today at rest.  Pt reports that he lifted his cat and had his L biceps cramp up on him which scared him. Pt states that he still has some painful pops in the shoulder although the pain goes away immediately rather than sticking around for a long time.         Objective Pt present to PT today with no distress at rest.     Pt with some popping in the R shoulder with OH stretching today although minimal pain.     Pt with no increased pain following activities in the clinic today.      See Exercise, Manual, and Modality Logs for complete treatment.     Assessment/Plan  Pt continues to have pain in the L shoulder with tenderness in the long head of the biceps and the infraspinatus. Pt to continue with PT to help improve L shoulder mobility, pain, and activity tolerance.       Progress per Plan of Care      Visit Diagnosis:    ICD-10-CM ICD-9-CM   1. Chronic left shoulder pain  M25.512 719.41    G89.29 338.29   2. S/P arthroscopy of left shoulder  Z98.890 V45.89            Manual Therapy:    17     mins  78173;  Therapeutic Exercise:    25     mins  81054;     Neuromuscular Jessee:    9    mins  93191;    Therapeutic Activity:          mins  02520;     Gait Training:           mins  53304;     Ultrasound:          mins  60641;    Electrical Stimulation:         mins  39118 ( );  Dry Needling          mins self-pay  Iontophoresis          mins 17569      Timed Treatment:   51   mins   Total Treatment:     61   mins    Cj Wallace, PT  Physical Therapist

## 2022-09-15 ENCOUNTER — OFFICE VISIT (OUTPATIENT)
Dept: ORTHOPEDIC SURGERY | Facility: CLINIC | Age: 57
End: 2022-09-15

## 2022-09-15 ENCOUNTER — TREATMENT (OUTPATIENT)
Dept: PHYSICAL THERAPY | Facility: CLINIC | Age: 57
End: 2022-09-15

## 2022-09-15 VITALS
SYSTOLIC BLOOD PRESSURE: 127 MMHG | HEIGHT: 68 IN | DIASTOLIC BLOOD PRESSURE: 74 MMHG | BODY MASS INDEX: 29.31 KG/M2 | WEIGHT: 193.4 LBS

## 2022-09-15 DIAGNOSIS — Z98.890 S/P ARTHROSCOPY OF SHOULDER: Primary | ICD-10-CM

## 2022-09-15 DIAGNOSIS — M25.512 CHRONIC LEFT SHOULDER PAIN: Primary | ICD-10-CM

## 2022-09-15 DIAGNOSIS — M19.012 ARTHRITIS OF LEFT ACROMIOCLAVICULAR JOINT: ICD-10-CM

## 2022-09-15 DIAGNOSIS — G89.29 CHRONIC LEFT SHOULDER PAIN: Primary | ICD-10-CM

## 2022-09-15 DIAGNOSIS — Z98.890 S/P ARTHROSCOPY OF LEFT SHOULDER: ICD-10-CM

## 2022-09-15 DIAGNOSIS — M75.02 ADHESIVE CAPSULITIS OF LEFT SHOULDER: ICD-10-CM
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
## 2022-09-15 PROCEDURE — 97112 NEUROMUSCULAR REEDUCATION: CPT | Performed by: PHYSICAL THERAPIST

## 2022-09-15 PROCEDURE — 99213 OFFICE O/P EST LOW 20 MIN: CPT | Performed by: ORTHOPAEDIC SURGERY

## 2022-09-15 PROCEDURE — 97110 THERAPEUTIC EXERCISES: CPT | Performed by: PHYSICAL THERAPIST

## 2022-09-15 PROCEDURE — 97140 MANUAL THERAPY 1/> REGIONS: CPT | Performed by: PHYSICAL THERAPIST

## 2022-09-15 NOTE — PROGRESS NOTES
Physical Therapy Daily Treatment Note      Visit #: 20    Micah Viera reports 3-4/10 pain today at rest.  Pt reports that his pain is about the same. Pt reports that his pain stays in the area of the L biceps and supraspinatus tendons. Pt states that he is seeing the surgeon again today and hopes to be released for some strengthening activities.         Objective          Active Range of Motion   Left Shoulder   Flexion: 150 degrees   Abduction: 146 degrees     Passive Range of Motion   Left Shoulder   Flexion: 173 degrees   Abduction: 177 degrees   External rotation 90°: 89 degrees   Internal rotation 90°: 65 degrees     Pt present to PT today with no distress at rest.     Pt with moderate tenderness in the L long head of the biceps.     Pt with no increased pain in the L shoulder following activities today.       See Exercise, Manual, and Modality Logs for complete treatment.     Assessment/Plan  Pt has good motion in the shoulder although the shoulder continues to be very irritable. Pt has less pain since the injection. Pt L shoulder seems unstable and would benefit from RC strengthening and some resistance training to help improve stability and pain free function.       Progress per Plan of Care      Visit Diagnosis:    ICD-10-CM ICD-9-CM   1. Chronic left shoulder pain  M25.512 719.41    G89.29 338.29   2. S/P arthroscopy of left shoulder  Z98.890 V45.89            Manual Therapy:    14     mins  72888;  Therapeutic Exercise:    15     mins  01191;     Neuromuscular Jessee:    12    mins  72294;    Therapeutic Activity:          mins  87437;     Gait Training:           mins  81063;     Ultrasound:          mins  23299;    Electrical Stimulation:         mins  32331 ( );  Dry Needling          mins self-pay  Iontophoresis          mins 44970      Timed Treatment:   41   mins   Total Treatment:     48   mins    Cj Wallace, PT  Physical Therapist

## 2022-09-15 NOTE — PROGRESS NOTES
"    [facility] [place] [facility] Clinic Note        Subjective     CC: Follow-up (4 week recheck - s/p left shoulder arthroscopy with subacromial decompression, major debridement of superior labrum anterior to posterior, debridement of the biceps stump, and chondroplasty of the glenoid and humeral head 6/3/22)      DYLAN Viera is a 56 y.o. male.  Patient is here today for follow-up after left shoulder arthroscopy with subacromial decompression and major debridement of the superior labrum and debridement of the biceps stump and chondroplasty of the glenoid and humeral head on 6/3/2022.  He was having a lot of pain and popping in the back of the shoulder at his last visit on 8/18/2022 and we injected his left glenohumeral joint for adhesive capsulitis.  This has helped him tremendously.  He still complains of popping in the back of the shoulder however.  No pain at the top the shoulder at the AC joint which is severely arthritic.    Overall, patient's symptoms are as above.    ROS:    Constiutional:Pt denies fever, chills, nausea, or vomiting.  MSK:as above        Objective      Past Medical History  Past Medical History:   Diagnosis Date   • Arthritis of back          Physical Exam  /74   Ht 172.7 cm (67.99\")   Wt 87.7 kg (193 lb 6.4 oz)   BMI 29.41 kg/m²     Body mass index is 29.41 kg/m².    Patient is well nourished and well developed.        Ortho Exam  Musculoskeletal   Upper Extremity   Left Shoulder       Strength and Tone:    Supraspinatus -5 out of 5    External Rotators-5/5    Infraspinatus - 5/5    Subscapularis - 5/5    Deltoid - 5/5     Range of Motion      Left Shoulder:    Internal Rotation: ROM - L4    External Rotation: AROM - 70 degrees    Elevation through flexion: AROM - 140 degrees     AC joint:  non tender to palpation    AC joint:  negative crossover          Imaging/Labs/EMG Reviewed:  Imaging Results (Last 24 Hours)     ** No results found for the last 24 hours. **    "         Assessment    Assessment:  1. S/P arthroscopy of shoulder    2. Work related injury    3. Arthritis of left acromioclavicular joint    4. Adhesive capsulitis of left shoulder        Plan:  Recommend over the counter anti-inflammatories for pain and/or swelling  Work-related left shoulder injury status post arthroscopy with postop adhesive capsulitis improved after glenohumeral injection--patient should advance to the strengthening phase and he should stay there for 4 to 6 weeks.  Anticipate getting him back to work in 4 to 6 weeks without restriction.  Do not believe advanced imaging is going to change what we do at this point given how well he is currently doing.      Jeremy Fan MD  09/15/22  11:20 EDT      Dictated Utilizing Dragon Dictation.

## 2022-09-20 ENCOUNTER — TREATMENT (OUTPATIENT)
Dept: PHYSICAL THERAPY | Facility: CLINIC | Age: 57
End: 2022-09-20

## 2022-09-20 DIAGNOSIS — M25.512 CHRONIC LEFT SHOULDER PAIN: Primary | ICD-10-CM

## 2022-09-20 DIAGNOSIS — Z98.890 S/P ARTHROSCOPY OF LEFT SHOULDER: ICD-10-CM

## 2022-09-20 DIAGNOSIS — G89.29 CHRONIC LEFT SHOULDER PAIN: Primary | ICD-10-CM

## 2022-09-20 PROCEDURE — 97112 NEUROMUSCULAR REEDUCATION: CPT | Performed by: PHYSICAL THERAPIST

## 2022-09-20 PROCEDURE — 97140 MANUAL THERAPY 1/> REGIONS: CPT | Performed by: PHYSICAL THERAPIST

## 2022-09-20 PROCEDURE — 97110 THERAPEUTIC EXERCISES: CPT | Performed by: PHYSICAL THERAPIST

## 2022-09-20 NOTE — PROGRESS NOTES
Physical Therapy Daily Treatment Note      Visit #: 21    Micah Viera reports 3/10 pain today at rest.  Pt states that he has been cleared to perform strengthening exercises. Pt reports that he still has his normal pain in the L shoulder in the front and back.         Objective Pt present to PT today with no distress at rest.     Pt with moderate tenderness in the anterior shoulder over the long head of the L biceps.     Pt with some discomfort with activities to progress strengthening in the L shoulder.     Pt with good ROM in the L shoulder with stretching today.       See Exercise, Manual, and Modality Logs for complete treatment.     Assessment/Plan  Pt continues to have pain in the L shoulder in the long head of the biceps. Pt to follow up again this week to continue with PT activities and progress strengthening as tolerated. HEP will be revamped at this time.       Progress per Plan of Care      Visit Diagnosis:    ICD-10-CM ICD-9-CM   1. Chronic left shoulder pain  M25.512 719.41    G89.29 338.29   2. S/P arthroscopy of left shoulder  Z98.890 V45.89            Manual Therapy:    11     mins  16951;  Therapeutic Exercise:    28     mins  63281;     Neuromuscular Jessee:    14    mins  48056;    Therapeutic Activity:          mins  80547;     Gait Training:           mins  06173;     Ultrasound:          mins  92235;    Electrical Stimulation:         mins  78903 ( );  Dry Needling          mins self-pay  Iontophoresis          mins 96116      Timed Treatment:   53   mins   Total Treatment:     58   mins    Cj Wallace, PT  Physical Therapist

## 2022-09-22 ENCOUNTER — TREATMENT (OUTPATIENT)
Dept: PHYSICAL THERAPY | Facility: CLINIC | Age: 57
End: 2022-09-22

## 2022-09-22 DIAGNOSIS — G89.29 CHRONIC LEFT SHOULDER PAIN: Primary | ICD-10-CM

## 2022-09-22 DIAGNOSIS — Z98.890 S/P ARTHROSCOPY OF LEFT SHOULDER: ICD-10-CM

## 2022-09-22 DIAGNOSIS — M25.512 CHRONIC LEFT SHOULDER PAIN: Primary | ICD-10-CM

## 2022-09-22 PROCEDURE — 97110 THERAPEUTIC EXERCISES: CPT | Performed by: PHYSICAL THERAPIST

## 2022-09-22 PROCEDURE — 97140 MANUAL THERAPY 1/> REGIONS: CPT | Performed by: PHYSICAL THERAPIST

## 2022-09-22 PROCEDURE — 97112 NEUROMUSCULAR REEDUCATION: CPT | Performed by: PHYSICAL THERAPIST

## 2022-09-22 NOTE — PROGRESS NOTES
Physical Therapy Daily Treatment Note      Visit #: 22    Micah Viera reports 3/10 pain today at rest.  Pt reports that the ice massage felt good last session but once it wore off, the shoulder was very sore. Pt states that his shoulder continues to have pain in the front over the long head of the biceps tendon.         Objective Pt present to PT today with no distress at rest.     Pt with tenderness in the anterior L long head of the biceps tendon.     Pt with tenderness in the posterior shoulder.     Pt with some irritation and soreness with activities in the clinic today but no increased pain following session.     Pt with good PROM in the L shoulder.       See Exercise, Manual, and Modality Logs for complete treatment.     Assessment/Plan  Pt continues to have pain in the L shoulder which will hopefully get better as he stabilizes the shoulder and progresses with strengthening. Pt to continue with strengthening activities in the clinic as tolerated.       Progress per Plan of Care      Visit Diagnosis:    ICD-10-CM ICD-9-CM   1. Chronic left shoulder pain  M25.512 719.41    G89.29 338.29   2. S/P arthroscopy of left shoulder  Z98.890 V45.89            Manual Therapy:    13     mins  60664;  Therapeutic Exercise:    19     mins  46322;     Neuromuscular Jessee:    8    mins  75201;    Therapeutic Activity:          mins  16191;     Gait Training:           mins  43789;     Ultrasound:          mins  11884;    Electrical Stimulation:         mins  58497 ( );  Dry Needling          mins self-pay  Iontophoresis          mins 02510      Timed Treatment:   40   mins   Total Treatment:     61   mins    Cj Wallace, PT  Physical Therapist

## 2022-09-29 ENCOUNTER — TREATMENT (OUTPATIENT)
Dept: PHYSICAL THERAPY | Facility: CLINIC | Age: 57
End: 2022-09-29

## 2022-09-29 DIAGNOSIS — G89.29 CHRONIC LEFT SHOULDER PAIN: Primary | ICD-10-CM

## 2022-09-29 DIAGNOSIS — Z98.890 S/P ARTHROSCOPY OF LEFT SHOULDER: ICD-10-CM

## 2022-09-29 DIAGNOSIS — M25.512 CHRONIC LEFT SHOULDER PAIN: Primary | ICD-10-CM

## 2022-09-29 PROCEDURE — 97140 MANUAL THERAPY 1/> REGIONS: CPT | Performed by: PHYSICAL THERAPIST

## 2022-09-29 PROCEDURE — 97112 NEUROMUSCULAR REEDUCATION: CPT | Performed by: PHYSICAL THERAPIST

## 2022-09-29 PROCEDURE — 97110 THERAPEUTIC EXERCISES: CPT | Performed by: PHYSICAL THERAPIST

## 2022-09-29 NOTE — PROGRESS NOTES
Physical Therapy Daily Treatment Note      Visit #: 23    Micah Viera reports 3-4/10 pain today at rest.  Pt reports that he had to help someone move over the weekend and did more lifting with his L shoulder than he should have. Pt reports that his pain got up to about 6/10 then returned to baseline. Pt reports that his L biceps started cramping on him and does this pretty regularly when lifting anything.         Objective Pt present to PT today with no distress at rest.     Pt with moderate tenderness in the L long head of the biceps tendon with palpation.     Pt with good PROM in the L shoulder.     Pt with no increased pain in the L shoulder following activities in the clinic today.       See Exercise, Manual, and Modality Logs for complete treatment.     Assessment/Plan  Pt continues to have pain in the front and back of the L shoulder. Pt to continue with PT to help improve L shoulder stability and strength to help improve pain and return to daily and work function.       Progress per Plan of Care      Visit Diagnosis:    ICD-10-CM ICD-9-CM   1. Chronic left shoulder pain  M25.512 719.41    G89.29 338.29   2. S/P arthroscopy of left shoulder  Z98.890 V45.89            Manual Therapy:    10     mins  53299;  Therapeutic Exercise:    30     mins  75797;     Neuromuscular Jessee:    13    mins  91011;    Therapeutic Activity:          mins  45777;     Gait Training:           mins  98700;     Ultrasound:          mins  81646;    Electrical Stimulation:         mins  76644 ( );  Dry Needling          mins self-pay  Iontophoresis          mins 06379      Timed Treatment:   53   mins   Total Treatment:     57   mins    Cj Wallace, PT  Physical Therapist

## 2022-10-04 ENCOUNTER — TREATMENT (OUTPATIENT)
Dept: PHYSICAL THERAPY | Facility: CLINIC | Age: 57
End: 2022-10-04

## 2022-10-04 ENCOUNTER — TELEPHONE (OUTPATIENT)
Dept: ORTHOPEDIC SURGERY | Facility: CLINIC | Age: 57
End: 2022-10-04
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
## 2022-10-04 DIAGNOSIS — Z98.890 S/P ARTHROSCOPY OF SHOULDER: Primary | ICD-10-CM

## 2022-10-04 DIAGNOSIS — M75.02 ADHESIVE CAPSULITIS OF LEFT SHOULDER: ICD-10-CM

## 2022-10-04 DIAGNOSIS — M19.012 ARTHRITIS OF LEFT ACROMIOCLAVICULAR JOINT: ICD-10-CM

## 2022-10-04 DIAGNOSIS — Z98.890 S/P ARTHROSCOPY OF LEFT SHOULDER: ICD-10-CM

## 2022-10-04 DIAGNOSIS — G89.29 CHRONIC LEFT SHOULDER PAIN: Primary | ICD-10-CM

## 2022-10-04 DIAGNOSIS — M25.512 CHRONIC LEFT SHOULDER PAIN: Primary | ICD-10-CM

## 2022-10-04 PROCEDURE — 97112 NEUROMUSCULAR REEDUCATION: CPT | Performed by: PHYSICAL THERAPIST

## 2022-10-04 PROCEDURE — 97140 MANUAL THERAPY 1/> REGIONS: CPT | Performed by: PHYSICAL THERAPIST

## 2022-10-04 PROCEDURE — 97110 THERAPEUTIC EXERCISES: CPT | Performed by: PHYSICAL THERAPIST

## 2022-10-04 NOTE — PROGRESS NOTES
Physical Therapy Daily Treatment Note       Visit #: 24    Micah Viera reports 4-5/10 pain today at rest. Pt stated he was late icing his shoulder after last visit and it felt better after applying ice. Pt states he feels as if the pain is coming back in his shoulder.        Objective Pt present to PT today with moderate distress at rest.    Pt had pain in his shoulder with activity today.    Pt had pain with roger cherrie test indicating he may have developed an impingement.     Pt had increased pain with ER PROM during manual.    Left shoulder flexion - 170                        Abduction - 172                        ER - 84                        IR - 65    See Exercise, Manual, and Modality Logs for complete treatment.     Assessment/Plan  Pt to continue POC with progression/regression as indicated in order to improve shoulder mobility and strength in order to decrease pain and improve activity tolerance.      Progress per Plan of Care      Visit Diagnosis:    ICD-10-CM ICD-9-CM   1. Chronic left shoulder pain  M25.512 719.41    G89.29 338.29   2. S/P arthroscopy of left shoulder  Z98.890 V45.89            Manual Therapy:    12     mins  01271;  Therapeutic Exercise:    16     mins  01023;     Neuromuscular Jessee:   15     mins  09383;    Therapeutic Activity:          mins  77988;     Gait Training:           mins  58429;     Ultrasound:          mins  14694;    Electrical Stimulation:         mins  85183 ( );  Dry Needling          mins self-pay  Iontophoresis          mins 20502      Timed Treatment:  43    mins   Total Treatment:    43    mins    Cj Wallace, PT  Physical Therapist

## 2022-10-04 NOTE — TELEPHONE ENCOUNTER
Patient called to see if Dr. Fan would write and order for him to continue Physical therapy.  Today is his last visit per the current order.  If there are any questions you can reach the patient.

## 2022-10-05 NOTE — TELEPHONE ENCOUNTER
Called patient and let him know that PT order is in Epic and will be available for therapist to see.     Jacey

## 2022-10-18 ENCOUNTER — OFFICE VISIT (OUTPATIENT)
Dept: ORTHOPEDIC SURGERY | Facility: CLINIC | Age: 57
End: 2022-10-18

## 2022-10-18 VITALS
BODY MASS INDEX: 29.37 KG/M2 | SYSTOLIC BLOOD PRESSURE: 140 MMHG | HEIGHT: 68 IN | DIASTOLIC BLOOD PRESSURE: 82 MMHG | WEIGHT: 193.8 LBS

## 2022-10-18 DIAGNOSIS — M75.20 TENDINITIS OF LONG HEAD OF BICEPS: ICD-10-CM

## 2022-10-18 DIAGNOSIS — M19.012 ARTHRITIS OF LEFT ACROMIOCLAVICULAR JOINT: ICD-10-CM

## 2022-10-18 DIAGNOSIS — Z98.890 S/P ARTHROSCOPY OF SHOULDER: Primary | ICD-10-CM

## 2022-10-18 DIAGNOSIS — M75.02 ADHESIVE CAPSULITIS OF LEFT SHOULDER: ICD-10-CM
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
## 2022-10-18 PROCEDURE — 99213 OFFICE O/P EST LOW 20 MIN: CPT | Performed by: ORTHOPAEDIC SURGERY

## 2022-10-18 RX ORDER — OXYCODONE HYDROCHLORIDE AND ACETAMINOPHEN 5; 325 MG/1; MG/1
1 TABLET ORAL 3 TIMES DAILY PRN
COMMUNITY
Start: 2022-09-28

## 2022-10-18 RX ORDER — TAMSULOSIN HYDROCHLORIDE 0.4 MG/1
1 CAPSULE ORAL
COMMUNITY
Start: 2022-09-28

## 2022-10-18 RX ORDER — SULFAMETHOXAZOLE AND TRIMETHOPRIM 800; 160 MG/1; MG/1
1 TABLET ORAL 2 TIMES DAILY
COMMUNITY
Start: 2022-09-28 | End: 2023-03-16

## 2022-10-18 NOTE — PROGRESS NOTES
"    [facility] [place] Orthopaedic Surgery Clinic Note        Subjective     CC: Follow-up (4 week recheck - s/p left shoulder arthroscopy with subacromial decompression, major debridement of superior labrum anterior to posterior, debridement of the biceps stump, and chondroplasty of the glenoid and humeral head 6/3/22)      HPI    Micah Viera is a 56 y.o. male.  Patient returns the office today now 4 months out from left shoulder arthroscopy subacromial decompression with major debridement of the superior labrum and debridement of the biceps stump and chondroplasty of the glenoid and humeral head on 6/3/2022.  He was injected on 8/18/2022 in the glenohumeral joint and this helped him.  He is complaining of pain in the biceps muscle belly today.  He says he got 6 more weeks of therapy approved.    Overall, patient's symptoms are as above.    ROS:    Constiutional:Pt denies fever, chills, nausea, or vomiting.  MSK:as above        Objective      Past Medical History  Past Medical History:   Diagnosis Date   • Arthritis of back          Physical Exam  /82   Ht 172.7 cm (67.99\")   Wt 87.9 kg (193 lb 12.8 oz)   BMI 29.47 kg/m²     Body mass index is 29.47 kg/m².    Patient is well nourished and well developed.        Ortho Exam  Musculoskeletal   Upper Extremity   Left Shoulder       Strength and Tone:    Supraspinatus -5 out of 5    External Rotators-5/5    Infraspinatus - 5/5    Subscapularis - 5/5    Deltoid - 5/5     Range of Motion      Left Shoulder:    Internal Rotation: ROM - L4    External Rotation: AROM - 70 degrees    Elevation through flexion: AROM - 140 degrees     AC joint:  non tender to palpation    AC joint:  negative crossover          Imaging/Labs/EMG Reviewed:  Imaging Results (Last 24 Hours)     ** No results found for the last 24 hours. **            Assessment    Assessment:  1. S/P arthroscopy of shoulder    2. Work related injury    3. Arthritis of left acromioclavicular joint    4. Adhesive " capsulitis of left shoulder    5. Tendinitis of long head of biceps        Plan:  Recommend over the counter anti-inflammatories for pain and/or swelling  Status post left shoulder arthroscopy and subacromial decompression and major debridement--patient had a biceps rupture.  He is having biceps muscle spasm now.  We recommended warm compress.  Continue strengthening and I will see him back in 6 weeks and we will plan on getting him back to work without restriction on 12/5/2022.      Jeremy Fan MD  10/18/22  14:03 EDT      Dictated Utilizing Dragon Dictation.

## 2022-11-03 ENCOUNTER — TREATMENT (OUTPATIENT)
Dept: PHYSICAL THERAPY | Facility: CLINIC | Age: 57
End: 2022-11-03

## 2022-11-03 DIAGNOSIS — M25.512 CHRONIC LEFT SHOULDER PAIN: Primary | ICD-10-CM

## 2022-11-03 DIAGNOSIS — G89.29 CHRONIC LEFT SHOULDER PAIN: Primary | ICD-10-CM

## 2022-11-03 DIAGNOSIS — Z98.890 S/P ARTHROSCOPY OF LEFT SHOULDER: ICD-10-CM

## 2022-11-03 PROCEDURE — 97112 NEUROMUSCULAR REEDUCATION: CPT | Performed by: PHYSICAL THERAPIST

## 2022-11-03 PROCEDURE — 97140 MANUAL THERAPY 1/> REGIONS: CPT | Performed by: PHYSICAL THERAPIST

## 2022-11-03 PROCEDURE — 97110 THERAPEUTIC EXERCISES: CPT | Performed by: PHYSICAL THERAPIST

## 2022-11-03 NOTE — PROGRESS NOTES
Physical Therapy Daily Treatment Note      Visit #: 25    Micah Viera reports 3-4/10 pain today at rest.  Pt reports that his L shoulder continues to hurt and have popping and weakness. Pt reports that his surgeon is going to release him to full duty at the beginning of December. Pt wants to work as much as he can with the shoulder to help prepare him for work.         Objective Pt present to PT today with no distress at rest.     Pt with tenderness in the L body of the biceps and long head the biceps tendon.     Pt with good PROM in the L shoulder although painful popping throughout.     Pt with burning in the L shoulder with RC activities.       See Exercise, Manual, and Modality Logs for complete treatment.     Assessment/Plan  Pt continues to have pain in the L shoulder but wants to do as much strengthening as he can tolerate. Pt to continue with strengthening and stabilization of the L shoulder to improve function and activity tolerance.       Progress per Plan of Care      Visit Diagnosis:    ICD-10-CM ICD-9-CM   1. Chronic left shoulder pain  M25.512 719.41    G89.29 338.29   2. S/P arthroscopy of left shoulder  Z98.890 V45.89            Manual Therapy:    9     mins  99525;  Therapeutic Exercise:    30     mins  81243;     Neuromuscular Jessee:    16    mins  55940;    Therapeutic Activity:          mins  69136;     Gait Training:           mins  68433;     Ultrasound:          mins  99524;    Electrical Stimulation:         mins  08854 ( );  Dry Needling          mins self-pay  Iontophoresis          mins 78571      Timed Treatment:   55   mins   Total Treatment:     58   mins    Cj Wallace, PT  Physical Therapist

## 2022-11-10 ENCOUNTER — TREATMENT (OUTPATIENT)
Dept: PHYSICAL THERAPY | Facility: CLINIC | Age: 57
End: 2022-11-10

## 2022-11-10 DIAGNOSIS — M25.512 CHRONIC LEFT SHOULDER PAIN: Primary | ICD-10-CM

## 2022-11-10 DIAGNOSIS — M25.512 ACUTE PAIN OF LEFT SHOULDER: ICD-10-CM

## 2022-11-10 DIAGNOSIS — G89.29 CHRONIC LEFT SHOULDER PAIN: Primary | ICD-10-CM

## 2022-11-10 DIAGNOSIS — Z98.890 S/P ARTHROSCOPY OF LEFT SHOULDER: ICD-10-CM

## 2022-11-10 PROCEDURE — 97530 THERAPEUTIC ACTIVITIES: CPT | Performed by: PHYSICAL THERAPIST

## 2022-11-10 PROCEDURE — 97112 NEUROMUSCULAR REEDUCATION: CPT | Performed by: PHYSICAL THERAPIST

## 2022-11-10 PROCEDURE — 97110 THERAPEUTIC EXERCISES: CPT | Performed by: PHYSICAL THERAPIST

## 2022-11-10 NOTE — PROGRESS NOTES
Physical Therapy Daily Treatment Note      Visit #: 26    Micah Viera reports 3-4/10 pain today at rest.  Pt reports that he was very sore and painful in the L shoulder for about 2 days after last session and has not returned to normal. Pt states that he is ready to do more today.         Objective Pt present to PT today with no distress at rest.      Pt with good motion in the L shoulder although had painful popping with some stretching today.     Pt with burning and some pain in the L shoulder with activities today.       See Exercise, Manual, and Modality Logs for complete treatment.     Assessment/Plan  Pt is able to do a lot in the clinic although still has painful popping in the L shoulder. Pt to continue with PT to help improve strength and stability of the L shoulder. Pt is returning to work shortly and wants to try to do as much with his shoulder as possible before then.       Progress per Plan of Care      Visit Diagnosis:    ICD-10-CM ICD-9-CM   1. Chronic left shoulder pain  M25.512 719.41    G89.29 338.29   2. S/P arthroscopy of left shoulder  Z98.890 V45.89   3. Acute pain of left shoulder  M25.512 719.41            Manual Therapy:    5     mins  35864;  Therapeutic Exercise:    30     mins  41264;     Neuromuscular Jessee:    12    mins  89333;    Therapeutic Activity:     10     mins  34383;     Gait Training:           mins  96847;     Ultrasound:          mins  67766;    Electrical Stimulation:         mins  33297 ( );  Dry Needling          mins self-pay  Iontophoresis          mins 20195      Timed Treatment:   57   mins   Total Treatment:     57   mins    Cj Wallace, PT  Physical Therapist

## 2022-11-29 ENCOUNTER — TREATMENT (OUTPATIENT)
Dept: PHYSICAL THERAPY | Facility: CLINIC | Age: 57
End: 2022-11-29

## 2022-11-29 DIAGNOSIS — Z98.890 S/P ARTHROSCOPY OF LEFT SHOULDER: ICD-10-CM

## 2022-11-29 DIAGNOSIS — G89.29 CHRONIC LEFT SHOULDER PAIN: Primary | ICD-10-CM

## 2022-11-29 DIAGNOSIS — M25.512 CHRONIC LEFT SHOULDER PAIN: Primary | ICD-10-CM

## 2022-11-29 PROCEDURE — 97112 NEUROMUSCULAR REEDUCATION: CPT | Performed by: PHYSICAL THERAPIST

## 2022-11-29 PROCEDURE — 97110 THERAPEUTIC EXERCISES: CPT | Performed by: PHYSICAL THERAPIST

## 2022-11-29 NOTE — PROGRESS NOTES
Physical Therapy Daily Treatment Note      Visit #: 27    Micah Viera reports 3-4/10 pain today at rest.  Pt states that he has had COVID for the last 2 weeks. Pt states that he has tried to use and work the L shoulder although he has been fatigued and sick. Pt reports that he is supposed to go back to work 12/5 if everything is good although he does not think he can return to his prior job due to the pain in the L shoulder. 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         Objective Pt present to PT today with no distress at rest.     Pt with acute tenderness over the L long head of the biceps and supraspinatus tendon with palpation.     Pt with burning in the lateral L shoulder with ER step outs that resolved with rest.     Pt with pain throughout the session today but no worse after session.     DN trial performed with supraspinatus, infraspinatus, and long head of the biceps x2 each.     Shoulder motion assessed without deficit noted.       See Exercise, Manual, and Modality Logs for complete treatment.     Assessment/Plan  PT did some needling today as this is something we have not tried. Pt is far enough out of surgery for DN to have beneficial effect. Pt to follow up on Thursday to assess reaction to treatment and DN. Pt continues to have good motion although his shoulder is still painful and weak.       Progress per Plan of Care      Visit Diagnosis:    ICD-10-CM ICD-9-CM   1. Chronic left shoulder pain  M25.512 719.41    G89.29 338.29   2. S/P arthroscopy of left shoulder  Z98.890 V45.89            Manual Therapy:         mins  83564;  Therapeutic Exercise:    30     mins  83321;     Neuromuscular Jessee:    14    mins  62145;    Therapeutic Activity:          mins  83792;     Gait Training:           mins  68118;     Ultrasound:          mins  78206;    Electrical Stimulation:         mins  19324 ( );  Dry Needling          mins  self-pay  Iontophoresis          mins 14692      Timed Treatment:   44   mins   Total Treatment:     62   mins    Cj Wallace, PT  Physical Therapist

## 2022-11-29 NOTE — PROGRESS NOTES
PATIENT WAS POSITIVE FOR COVID 2+ WEEKS AGO AND IS STILL TESTING POSITIVE EVEN AFTER THE QUARANTINE DATE HAS BEEN COMPLETED.

## 2022-12-01 ENCOUNTER — OFFICE VISIT (OUTPATIENT)
Dept: ORTHOPEDIC SURGERY | Facility: CLINIC | Age: 57
End: 2022-12-01

## 2022-12-01 ENCOUNTER — TREATMENT (OUTPATIENT)
Dept: PHYSICAL THERAPY | Facility: CLINIC | Age: 57
End: 2022-12-01

## 2022-12-01 VITALS
BODY MASS INDEX: 29.25 KG/M2 | HEIGHT: 68 IN | SYSTOLIC BLOOD PRESSURE: 134 MMHG | DIASTOLIC BLOOD PRESSURE: 70 MMHG | WEIGHT: 193 LBS

## 2022-12-01 DIAGNOSIS — G89.29 CHRONIC LEFT SHOULDER PAIN: Primary | ICD-10-CM

## 2022-12-01 DIAGNOSIS — M19.012 ARTHRITIS OF LEFT ACROMIOCLAVICULAR JOINT: ICD-10-CM

## 2022-12-01 DIAGNOSIS — M75.02 ADHESIVE CAPSULITIS OF LEFT SHOULDER: ICD-10-CM

## 2022-12-01 DIAGNOSIS — M25.512 CHRONIC LEFT SHOULDER PAIN: Primary | ICD-10-CM

## 2022-12-01 DIAGNOSIS — Z98.890 S/P ARTHROSCOPY OF LEFT SHOULDER: ICD-10-CM

## 2022-12-01 DIAGNOSIS — Z98.890 S/P ARTHROSCOPY OF SHOULDER: Primary | ICD-10-CM
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
## 2022-12-01 PROCEDURE — 97110 THERAPEUTIC EXERCISES: CPT | Performed by: PHYSICAL THERAPIST

## 2022-12-01 PROCEDURE — 99213 OFFICE O/P EST LOW 20 MIN: CPT | Performed by: ORTHOPAEDIC SURGERY

## 2022-12-01 PROCEDURE — 97112 NEUROMUSCULAR REEDUCATION: CPT | Performed by: PHYSICAL THERAPIST

## 2022-12-01 PROCEDURE — 97140 MANUAL THERAPY 1/> REGIONS: CPT | Performed by: PHYSICAL THERAPIST

## 2022-12-01 NOTE — PROGRESS NOTES
"    [facility] [place] Orthopaedic Surgery Clinic Note        Subjective     CC: Follow-up (6 week follow up -- 6 month s/p left shoulder arthroscopy with subacromial decompression, major debridement of superior labrum anterior to posterior, debridement of the biceps stump, and chondroplasty of the glenoid and humeral head 6/3/22)      DYLAN Viera is a 56 y.o. male.  Patient is here today for 6-month follow-up from his work-related shoulder injury and surgery that required left shoulder arthroscopy with subacromial decompression, major debridement of his superior labrum and debridement of his biceps stump and chondroplasty of the glenoid and humeral head.  He tells me his biceps muscle spasm has gotten better but now he is having debilitating popping in the shoulder that occurs centrally and in the posterior aspect.  He has been working diligently with physical therapy.    Overall, patient's symptoms are as above.    ROS:    Constiutional:Pt denies fever, chills, nausea, or vomiting.  MSK:as above        Objective      Past Medical History  Past Medical History:   Diagnosis Date   • Arthritis of back      Social History     Socioeconomic History   • Marital status:    Tobacco Use   • Smoking status: Never   • Smokeless tobacco: Current     Types: Snuff   Vaping Use   • Vaping Use: Never used   Substance and Sexual Activity   • Alcohol use: Yes   • Drug use: Never   • Sexual activity: Defer          Physical Exam  /70   Ht 172.7 cm (67.99\")   Wt 87.5 kg (193 lb)   BMI 29.35 kg/m²     Body mass index is 29.35 kg/m².    Patient is well nourished and well developed.        Ortho Exam  Musculoskeletal   Upper Extremity   Left Shoulder       Strength and Tone:    Supraspinatus -5 out of 5 with pain    External Rotators-5/5    Infraspinatus - 5/5    Subscapularis - 5/5    Deltoid - 5/5     Range of Motion      Left Shoulder:    Internal Rotation: ROM - L4    External Rotation: AROM - 70 degrees    Elevation " through flexion: AROM - 140 degrees     AC joint:  non tender to palpation    AC joint:  negative crossover    O'Briens: Positive        Imaging/Labs/EMG Reviewed:  Imaging Results (Last 24 Hours)     ** No results found for the last 24 hours. **            Assessment    Assessment:  1. S/P arthroscopy of shoulder    2. Work related injury    3. Arthritis of left acromioclavicular joint    4. Adhesive capsulitis of left shoulder        Plan:  Recommend over the counter anti-inflammatories for pain and/or swelling  Status post left shoulder arthroscopy with major debridement with preop biceps rupture and postop popping--plan will be for repeat MRI to make sure that structurally the patient's rotator cuff remains intact and we can closely assess the articular cartilage as well.  Hold off on getting back to work for now until we know that structurally he is okay.      Jeremy Fan MD  12/01/22  12:32 EST      Dictated Utilizing Dragon Dictation.

## 2022-12-01 NOTE — PROGRESS NOTES
Physical Therapy Daily Treatment Note      Visit #: 28    Micah Viera reports 3/10 pain today at rest.  Pt reports that the front of his shoulder feels a lot better after needling although the back still hurts and is painful. Pt reports that his L shoulder still has popping and pain in the back and down the arm. Pt reports that he has trouble lifting anything more than a couple pounds and even the weight of his arm going overhead causes pain and popping.         Objective Pt present to PT today with no distress at rest.     Pt with pain with lifting into scaption and doing wall slides with resistance.     Pt with no issues with DN today. Supraspinatus tendon x1, long head of the biceps tendon x2, posterior deltoid x2, supraspinatus x1.     Pt with no increased pain in the L shoulder at the end of session although had pain throughout.       See Exercise, Manual, and Modality Logs for complete treatment.     Assessment/Plan  Pt continues to have pain in the L shoulder and missed 2 weeks with COVID limiting the amount of work he was able to do with PT and strengthening his L shoulder. PT is concerned that he will injure himself again if he returns to work. We have had limited success with dry needling although has only been done twice. Pt to continue with PT after seeing surgeon. Pt will contact clinic.       Progress per Plan of Care      Visit Diagnosis:    ICD-10-CM ICD-9-CM   1. Chronic left shoulder pain  M25.512 719.41    G89.29 338.29   2. S/P arthroscopy of left shoulder  Z98.890 V45.89            Manual Therapy:    8     mins  77082;  Therapeutic Exercise:    20     mins  59185;     Neuromuscular Jessee:    12    mins  98971;    Therapeutic Activity:          mins  87370;     Gait Training:           mins  63395;     Ultrasound:          mins  03031;    Electrical Stimulation:         mins  36219 ( );  Dry Needling          mins self-pay  Iontophoresis          mins 63822      Timed Treatment:   40    mins   Total Treatment:     62   mins    Cj Wallace, PT  Physical Therapist

## 2022-12-14 ENCOUNTER — TREATMENT (OUTPATIENT)
Dept: PHYSICAL THERAPY | Facility: CLINIC | Age: 57
End: 2022-12-14

## 2022-12-14 DIAGNOSIS — G89.29 CHRONIC LEFT SHOULDER PAIN: Primary | ICD-10-CM

## 2022-12-14 DIAGNOSIS — Z98.890 S/P ARTHROSCOPY OF LEFT SHOULDER: ICD-10-CM

## 2022-12-14 DIAGNOSIS — M25.512 CHRONIC LEFT SHOULDER PAIN: Primary | ICD-10-CM

## 2022-12-14 PROCEDURE — 97140 MANUAL THERAPY 1/> REGIONS: CPT | Performed by: PHYSICAL THERAPIST

## 2022-12-14 PROCEDURE — 97110 THERAPEUTIC EXERCISES: CPT | Performed by: PHYSICAL THERAPIST

## 2022-12-14 PROCEDURE — 97112 NEUROMUSCULAR REEDUCATION: CPT | Performed by: PHYSICAL THERAPIST

## 2022-12-14 NOTE — PROGRESS NOTES
Physical Therapy Daily Treatment Note      Visit #: 29    Micah Viera reports 3/10 pain today at rest.  Pt states that the needling made him sore but seemed to help. Pt states that he has continued to have pain and is waiting to get another MRI. Pt states that he is going back to ortho the beginning of January if he has gotten the MRI at that time.         Objective Pt present to PT today with no distress at rest.     Pt with pain in the L shoulder with activities today. Pt with some decreased irritation in the anterior L shoulder today overall but still had pain.       See Exercise, Manual, and Modality Logs for complete treatment.     Assessment/Plan  Pt continues to have pain in the L shoulder with popping and painful catches. Pt to continue with activities in the clinic as he awaits his MRI.       Progress per Plan of Care      Visit Diagnosis:    ICD-10-CM ICD-9-CM   1. Chronic left shoulder pain  M25.512 719.41    G89.29 338.29   2. S/P arthroscopy of left shoulder  Z98.890 V45.89            Manual Therapy:    10     mins  57451;  Therapeutic Exercise:    30     mins  08334;     Neuromuscular Jessee:    15    mins  64159;    Therapeutic Activity:          mins  53006;     Gait Training:           mins  89675;     Ultrasound:          mins  72492;    Electrical Stimulation:         mins  37352 ( );  Dry Needling          mins self-pay  Iontophoresis          mins 87870      Timed Treatment:  55    mins   Total Treatment:     70   mins    Cj Wallace, PT  Physical Therapist

## 2022-12-21 ENCOUNTER — TREATMENT (OUTPATIENT)
Dept: PHYSICAL THERAPY | Facility: CLINIC | Age: 57
End: 2022-12-21

## 2022-12-21 DIAGNOSIS — M25.512 CHRONIC LEFT SHOULDER PAIN: Primary | ICD-10-CM

## 2022-12-21 DIAGNOSIS — Z98.890 S/P ARTHROSCOPY OF LEFT SHOULDER: ICD-10-CM

## 2022-12-21 DIAGNOSIS — G89.29 CHRONIC LEFT SHOULDER PAIN: Primary | ICD-10-CM

## 2022-12-21 PROCEDURE — 97110 THERAPEUTIC EXERCISES: CPT | Performed by: PHYSICAL THERAPIST

## 2022-12-21 PROCEDURE — 97112 NEUROMUSCULAR REEDUCATION: CPT | Performed by: PHYSICAL THERAPIST

## 2022-12-21 PROCEDURE — 97140 MANUAL THERAPY 1/> REGIONS: CPT | Performed by: PHYSICAL THERAPIST

## 2022-12-21 NOTE — PROGRESS NOTES
Physical Therapy Daily Treatment Note      Visit #: 30    Micah Viera reports 3-4/10 pain today at rest.  Pt reports that the side and back of his shoulder feels about the same and were very sore after needling so he wants to hold off to feel good for niurka. Pt states that the front of his L shoulder is actually feeling a lot better since needling. Pt states that overall, his function is about the same with pain with lifting anything out from his body and painful popping in the shoulder with activity.         Objective Pt present to PT today with no distress at rest.     Pt with painful pops in the L shoulder with passive stretching in abduction.     Pt with tenderness in the anterior L shoulder despite decreased pain.     Pt had pain with activities today in the clinic but was able to complete all activities.       See Exercise, Manual, and Modality Logs for complete treatment.     Assessment/Plan  Pt continues to have pain and popping in the L shoulder. Pt to continue with activities as tolerated as he awaits a new MRI order.       Progress per Plan of Care      Visit Diagnosis:    ICD-10-CM ICD-9-CM   1. Chronic left shoulder pain  M25.512 719.41    G89.29 338.29   2. S/P arthroscopy of left shoulder  Z98.890 V45.89            Manual Therapy:    10     mins  72603;  Therapeutic Exercise:    38     mins  55394;     Neuromuscular Jessee:    13    mins  44179;    Therapeutic Activity:          mins  35953;     Gait Training:           mins  61902;     Ultrasound:          mins  40096;    Electrical Stimulation:         mins  56053 ( );  Dry Needling          mins self-pay  Iontophoresis          mins 63547      Timed Treatment:   51   mins   Total Treatment:     51   mins    Cj Wallace, PT  Physical Therapist

## 2022-12-28 ENCOUNTER — TREATMENT (OUTPATIENT)
Dept: PHYSICAL THERAPY | Facility: CLINIC | Age: 57
End: 2022-12-28

## 2022-12-28 DIAGNOSIS — Z98.890 S/P ARTHROSCOPY OF LEFT SHOULDER: ICD-10-CM

## 2022-12-28 DIAGNOSIS — M25.512 CHRONIC LEFT SHOULDER PAIN: Primary | ICD-10-CM

## 2022-12-28 DIAGNOSIS — G89.29 CHRONIC LEFT SHOULDER PAIN: Primary | ICD-10-CM

## 2022-12-28 PROCEDURE — 97110 THERAPEUTIC EXERCISES: CPT | Performed by: PHYSICAL THERAPIST

## 2022-12-28 PROCEDURE — 97112 NEUROMUSCULAR REEDUCATION: CPT | Performed by: PHYSICAL THERAPIST

## 2022-12-28 NOTE — PROGRESS NOTES
Physical Therapy Daily Treatment Note      Visit #: 31    Micah Viera reports 2-3/10 pain today at rest.  Pt reports that he hurts for a couple days after doing PT activities in the clinic. Pt states that his shoulder is not too bad today but is nervous his pain is going to get worse.         Objective Pt present to PT today with no distress at rest.     Pt with pain in the posterolateral shoulder on the L side that became worse with activities today.     Pt anterior shoulder pain has improved.     Pt with good strength with MMT for ER of the L shoulder although painful.       See Exercise, Manual, and Modality Logs for complete treatment.     Assessment/Plan  Pt has a partial tear that shows on the MRI in the supraspinatus tendon. This seems to be what is irritating his shoulder at this time and does not seem to be improving. Pt to follow up next week and PT will arrange for more comfortable exercises to reduce irritation of the L shoulder.       Progress per Plan of Care      Visit Diagnosis:    ICD-10-CM ICD-9-CM   1. Chronic left shoulder pain  M25.512 719.41    G89.29 338.29   2. S/P arthroscopy of left shoulder  Z98.890 V45.89            Manual Therapy:    5     mins  53759;  Therapeutic Exercise:    28     mins  28773;     Neuromuscular Jessee:    16    mins  56379;    Therapeutic Activity:          mins  56455;     Gait Training:           mins  89956;     Ultrasound:          mins  78090;    Electrical Stimulation:         mins  89220 ( );  Dry Needling          mins self-pay  Iontophoresis          mins 37511      Timed Treatment:   49   mins   Total Treatment:     54   mins    Cj Wallace, PT  Physical Therapist

## 2023-01-04 ENCOUNTER — TELEPHONE (OUTPATIENT)
Dept: PHYSICAL THERAPY | Facility: CLINIC | Age: 58
End: 2023-01-04

## 2023-01-09 ENCOUNTER — TREATMENT (OUTPATIENT)
Dept: PHYSICAL THERAPY | Facility: CLINIC | Age: 58
End: 2023-01-09
Payer: OTHER MISCELLANEOUS

## 2023-01-09 DIAGNOSIS — G89.29 CHRONIC LEFT SHOULDER PAIN: Primary | ICD-10-CM

## 2023-01-09 DIAGNOSIS — M25.512 CHRONIC LEFT SHOULDER PAIN: Primary | ICD-10-CM

## 2023-01-09 DIAGNOSIS — Z98.890 S/P ARTHROSCOPY OF LEFT SHOULDER: ICD-10-CM

## 2023-01-09 PROCEDURE — 97110 THERAPEUTIC EXERCISES: CPT | Performed by: PHYSICAL THERAPIST

## 2023-01-09 PROCEDURE — 97140 MANUAL THERAPY 1/> REGIONS: CPT | Performed by: PHYSICAL THERAPIST

## 2023-01-09 PROCEDURE — 97112 NEUROMUSCULAR REEDUCATION: CPT | Performed by: PHYSICAL THERAPIST

## 2023-01-09 NOTE — PROGRESS NOTES
Physical Therapy Daily Treatment Note      Visit #: 32    Micah Viera reports 3-4/10 pain today at rest.  Pt reports that the front of the L shoulder is feeling better but the back is hurting and popping consistently. 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 Pt reports frustration with workman's comp and his situation. Pt reports that he is scheduled for an MRI later this month.         Objective Pt present to PT today with no distress at rest.     Pt given cues to avoid pain with scaption today.     Pt with pain in the shoulder throughout session although worse pain avoided.       See Exercise, Manual, and Modality Logs for complete treatment.     Assessment/Plan  Pt continues to have pain in the L shoulder and is going to get an MRI later this month. Pt to continue with PT to help improve strength, stability, and pain free activity tolerance.       Progress per Plan of Care      Visit Diagnosis:    ICD-10-CM ICD-9-CM   1. Chronic left shoulder pain  M25.512 719.41    G89.29 338.29   2. S/P arthroscopy of left shoulder  Z98.890 V45.89            Manual Therapy:    10     mins  74139;  Therapeutic Exercise:    26     mins  37752;     Neuromuscular Jessee:    17    mins  60121;    Therapeutic Activity:          mins  79369;     Gait Training:           mins  52446;     Ultrasound:          mins  42827;    Electrical Stimulation:         mins  24229 ( );  Dry Needling          mins self-pay  Iontophoresis          mins 49586      Timed Treatment:   53   mins   Total Treatment:     53   mins    Cj Wallace, PT  Physical Therapist

## 2023-01-11 ENCOUNTER — TELEPHONE (OUTPATIENT)
Dept: PHYSICAL THERAPY | Facility: CLINIC | Age: 58
End: 2023-01-11

## 2023-01-16 ENCOUNTER — TREATMENT (OUTPATIENT)
Dept: PHYSICAL THERAPY | Facility: CLINIC | Age: 58
End: 2023-01-16
Payer: OTHER MISCELLANEOUS

## 2023-01-16 DIAGNOSIS — G89.29 CHRONIC LEFT SHOULDER PAIN: Primary | ICD-10-CM

## 2023-01-16 DIAGNOSIS — Z98.890 S/P ARTHROSCOPY OF LEFT SHOULDER: ICD-10-CM

## 2023-01-16 DIAGNOSIS — M25.512 CHRONIC LEFT SHOULDER PAIN: Primary | ICD-10-CM

## 2023-01-16 PROCEDURE — 97112 NEUROMUSCULAR REEDUCATION: CPT | Performed by: PHYSICAL THERAPIST

## 2023-01-16 PROCEDURE — 97110 THERAPEUTIC EXERCISES: CPT | Performed by: PHYSICAL THERAPIST

## 2023-01-16 PROCEDURE — 97140 MANUAL THERAPY 1/> REGIONS: CPT | Performed by: PHYSICAL THERAPIST

## 2023-01-16 NOTE — PROGRESS NOTES
Physical Therapy Daily Treatment Note      Visit #: 33    Micah Viera reports 3-4/10 pain today at rest.  Pt reports that his L shoulder is hurting more than usual. Pt reports that he has been trying to use the L shoulder but any time he lifts the pain is really bad. Pt reports that the front of the L shoulder has greatly improved but the posterolateral aspect of the L shoulder is painful and causes a lot of popping and catching. Pt reports having disability paperwork that he needs to be filled out.         Objective Pt present to PT today with no distress at rest.     Pt instructed to bring paperwork to ortho for his surgeon.     Pt with good motion in the L shoulder although painful and popping in the back of the shoulder with tenderness.       See Exercise, Manual, and Modality Logs for complete treatment.     Assessment/Plan  Pt to continue with PT to help improve L shoulder pain and maintain mobility, strength, and function. Pt still has a lot of pain and is waiting for his MRI.       Progress per Plan of Care      Visit Diagnosis:    ICD-10-CM ICD-9-CM   1. Chronic left shoulder pain  M25.512 719.41    G89.29 338.29   2. S/P arthroscopy of left shoulder  Z98.890 V45.89            Manual Therapy:    10     mins  41067;  Therapeutic Exercise:    26     mins  56810;     Neuromuscular Jessee:    12    mins  57502;    Therapeutic Activity:          mins  42460;     Gait Training:           mins  84757;     Ultrasound:          mins  06072;    Electrical Stimulation:         mins  87869 ( );  Dry Needling          mins self-pay  Iontophoresis          mins 41595      Timed Treatment:   48   mins   Total Treatment:     50   mins    Cj Wallace, PT  Physical Therapist

## 2023-01-18 ENCOUNTER — TELEPHONE (OUTPATIENT)
Dept: PHYSICAL THERAPY | Facility: CLINIC | Age: 58
End: 2023-01-18
Payer: OTHER MISCELLANEOUS

## 2023-01-18 NOTE — TELEPHONE ENCOUNTER
Caller: Micah Viera    Relationship: Self       What was the call regarding: ARM IS HURTING TO BAD TODAY, CAN NOT EVEN LIFT IT TODAY

## 2023-01-23 ENCOUNTER — TREATMENT (OUTPATIENT)
Dept: PHYSICAL THERAPY | Facility: CLINIC | Age: 58
End: 2023-01-23
Payer: OTHER MISCELLANEOUS

## 2023-01-23 DIAGNOSIS — Z98.890 S/P ARTHROSCOPY OF LEFT SHOULDER: ICD-10-CM

## 2023-01-23 DIAGNOSIS — M25.512 CHRONIC LEFT SHOULDER PAIN: Primary | ICD-10-CM

## 2023-01-23 DIAGNOSIS — G89.29 CHRONIC LEFT SHOULDER PAIN: Primary | ICD-10-CM

## 2023-01-23 PROCEDURE — 97140 MANUAL THERAPY 1/> REGIONS: CPT | Performed by: PHYSICAL THERAPIST

## 2023-01-23 PROCEDURE — 97112 NEUROMUSCULAR REEDUCATION: CPT | Performed by: PHYSICAL THERAPIST

## 2023-01-23 PROCEDURE — 97110 THERAPEUTIC EXERCISES: CPT | Performed by: PHYSICAL THERAPIST

## 2023-01-23 NOTE — PROGRESS NOTES
Physical Therapy Daily Treatment Note      Visit #: 34    Micah Viera reports 5-6/10 pain today at rest.  Pt reports that his pain continues to be in the posterolateral L shoulder. Pt states that the front of his shoulder feels much better but the shoulder continues to pop and have a lot of pain with lifting and activity.         Objective Pt present to PT today with no distress at rest.     Pt performed minimal strengthening work today and concentrated on mobility work today.     Pt with pain with mobilization of the shoulder today limiting full flexion of the L shoulder.       See Exercise, Manual, and Modality Logs for complete treatment.     Assessment/Plan  Pt continues to have a lot of pain that has gotten worse in the L shoulder recently. Pt to follow up next visit and then get an MRI to determine if there is anything going on in the L shoulder that has not been diagnosed.       Progress per Plan of Care      Visit Diagnosis:    ICD-10-CM ICD-9-CM   1. Chronic left shoulder pain  M25.512 719.41    G89.29 338.29   2. S/P arthroscopy of left shoulder  Z98.890 V45.89              Timed Treatment:  Manual Therapy:    11     mins  93148;  Therapeutic Exercise:    30     mins  67563;     Neuromuscular Jessee:    10    mins  82099;    Therapeutic Activity:          mins  33198;     Gait Training:           mins  22330;     Ultrasound:          mins  43578;    Electrical Stimulation:         mins  64130 ( );  Dry Needling          mins self-pay  Iontophoresis          mins 99700    Untimed Treatments:  Electrical Stimulation:         mins  34372 ( );  Dry Needling:                     mins  Ultrasound:                         mins  41706;        Timed Treatment:   51   mins   Total Treatment:     51   mins    Cj Wallace, PT  Physical Therapist

## 2023-01-25 ENCOUNTER — TELEPHONE (OUTPATIENT)
Dept: PHYSICAL THERAPY | Facility: CLINIC | Age: 58
End: 2023-01-25

## 2023-01-27 ENCOUNTER — HOSPITAL ENCOUNTER (OUTPATIENT)
Dept: MRI IMAGING | Facility: HOSPITAL | Age: 58
Discharge: HOME | End: 2023-01-27
Admitting: ORTHOPAEDIC SURGERY
Payer: OTHER MISCELLANEOUS
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
## 2023-01-27 DIAGNOSIS — M75.02 ADHESIVE CAPSULITIS OF LEFT SHOULDER: ICD-10-CM

## 2023-01-27 DIAGNOSIS — M19.012 ARTHRITIS OF LEFT ACROMIOCLAVICULAR JOINT: ICD-10-CM

## 2023-01-27 DIAGNOSIS — Z98.890 S/P ARTHROSCOPY OF SHOULDER: ICD-10-CM

## 2023-01-27 PROCEDURE — 73221 MRI JOINT UPR EXTREM W/O DYE: CPT

## 2023-02-02 ENCOUNTER — OFFICE VISIT (OUTPATIENT)
Dept: ORTHOPEDIC SURGERY | Facility: CLINIC | Age: 58
End: 2023-02-02
Payer: OTHER MISCELLANEOUS

## 2023-02-02 VITALS
SYSTOLIC BLOOD PRESSURE: 131 MMHG | BODY MASS INDEX: 29.95 KG/M2 | DIASTOLIC BLOOD PRESSURE: 89 MMHG | HEIGHT: 68 IN | WEIGHT: 197.6 LBS

## 2023-02-02 DIAGNOSIS — Z98.890 S/P ARTHROSCOPY OF SHOULDER: Primary | ICD-10-CM
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
## 2023-02-02 DIAGNOSIS — S46.212D RUPTURE OF PROXIMAL BICEPS TENDON, LEFT, SUBSEQUENT ENCOUNTER: ICD-10-CM

## 2023-02-02 DIAGNOSIS — M75.02 ADHESIVE CAPSULITIS OF LEFT SHOULDER: ICD-10-CM

## 2023-02-02 DIAGNOSIS — M19.012 PRIMARY OSTEOARTHRITIS OF LEFT SHOULDER: ICD-10-CM

## 2023-02-02 DIAGNOSIS — M19.012 ARTHRITIS OF LEFT ACROMIOCLAVICULAR JOINT: ICD-10-CM

## 2023-02-02 PROCEDURE — 99214 OFFICE O/P EST MOD 30 MIN: CPT | Performed by: ORTHOPAEDIC SURGERY

## 2023-02-02 PROCEDURE — 20610 DRAIN/INJ JOINT/BURSA W/O US: CPT | Performed by: ORTHOPAEDIC SURGERY

## 2023-02-02 RX ORDER — LIDOCAINE HYDROCHLORIDE 10 MG/ML
3 INJECTION, SOLUTION EPIDURAL; INFILTRATION; INTRACAUDAL; PERINEURAL
Status: COMPLETED | OUTPATIENT
Start: 2023-02-02 | End: 2023-02-02

## 2023-02-02 RX ORDER — TRIAMCINOLONE ACETONIDE 40 MG/ML
40 INJECTION, SUSPENSION INTRA-ARTICULAR; INTRAMUSCULAR
Status: COMPLETED | OUTPATIENT
Start: 2023-02-02 | End: 2023-02-02

## 2023-02-02 RX ADMIN — TRIAMCINOLONE ACETONIDE 40 MG: 40 INJECTION, SUSPENSION INTRA-ARTICULAR; INTRAMUSCULAR at 09:29

## 2023-02-02 RX ADMIN — LIDOCAINE HYDROCHLORIDE 3 ML: 10 INJECTION, SOLUTION EPIDURAL; INFILTRATION; INTRACAUDAL; PERINEURAL at 09:29

## 2023-02-02 NOTE — PROGRESS NOTES
Procedure   Large Joint Arthrocentesis: L glenohumeral  Date/Time: 2/2/2023 9:29 AM  Consent given by: patient  Site marked: site marked  Timeout: Immediately prior to procedure a time out was called to verify the correct patient, procedure, equipment, support staff and site/side marked as required   Supporting Documentation  Indications: pain   Procedure Details  Location: shoulder - L glenohumeral  Preparation: Patient was prepped and draped in the usual sterile fashion  Needle size: 23 G  Approach: anterior  Medications administered: 3 mL lidocaine PF 1% 1 %; 40 mg triamcinolone acetonide 40 MG/ML  Patient tolerance: patient tolerated the procedure well with no immediate complications

## 2023-02-02 NOTE — PROGRESS NOTES
"    [facility] [place] Orthopaedic Surgery Clinic Note        Subjective     CC: Follow-up (2 month MRI follow up -- MRI done 1/27/23; 8 month s/p left shoulder arthroscopy with subacromial decompression, major debridement of superior labrum anterior to posterior, debridement of the biceps stump, and chondroplasty of the glenoid and humeral head 6/3/22)      DYLAN Viera is a 57 y.o. male.  Patient is here with his wife today for follow-up after the MRI of his left shoulder.  Index surgery 6/3/2022 where he underwent left shoulder arthroscopy with subacromial decompression and major debridement of his labrum and debridement of his biceps stump from a biceps tendon rupture and chondroplasty of the glenoid and humeral head.  Patient continues to complain of popping in the shoulder that occurs with movement and abduction.  Initially diagnosed postoperatively with adhesive capsulitis, his range of motion has gotten better but his pain persists.  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    Overall, patient's symptoms are as above.    ROS:    Constiutional:Pt denies fever, chills, nausea, or vomiting.  MSK:as above        Objective      Past Medical History  Past Medical History:   Diagnosis Date   • Arthritis of back      Social History     Socioeconomic History   • Marital status:    Tobacco Use   • Smoking status: Never   • Smokeless tobacco: Current     Types: Snuff   Vaping Use   • Vaping Use: Never used   Substance and Sexual Activity   • Alcohol use: Yes   • Drug use: Never   • Sexual activity: Defer          Physical Exam  /89   Ht 172.7 cm (67.99\")   Wt 89.6 kg (197 lb 9.6 oz)   BMI 30.05 kg/m²     Body mass index is 30.05 kg/m².    Patient is well nourished and well developed.        Ortho Exam  Musculoskeletal   Upper Extremity   Left Shoulder     Inspection and Palpation:     Medial Border Scapular Tenderness-none    AC Joint Tenderness -none    Sensation is " normal    Examination reveals no ecchymosis.        Strength and Tone:    Supraspinatus - 5/5 with pain    External Rotators-5/5 with pain    Infraspinatus - 5/5    Subscapularis - 5/5 with pain    Deltoid - 5/5     Range of Motion      Left Shoulder:    Internal Rotation: ROM - L4    External Rotation: AROM - 60 degrees    Elevation through flexion: AROM - 130 degrees        Impingement   Left shoulder    Solomon-Brett impingement test positive    Neer impingement test negative     Functional Testing   Left shoulder    AC crossover adduction test positive    Speeds test negative    Uppercut test negative    O'Briens test negative    Drop arm sign negative    Apprehension relocation negative        Imaging/Labs/EMG Reviewed:  Imaging Results (Last 24 Hours)     ** No results found for the last 24 hours. **        We reviewed x-rays in the office today and have compared them to prior x-rays.  We have also reviewed the MRI images and report from 1/27/2023.  Patient has no evidence of any worsening rotator cuff pathology.  There is more narrowing of the glenohumeral joint space and articular cartilage thinning diffusely with some posterior labral pathology and cystic changes noted in the posterior glenoid.  Radiographs show narrowing in the axillary lateral view of the joint space by at least 50%.    Assessment    Assessment:  1. S/P arthroscopy of shoulder    2. Work related injury    3. Adhesive capsulitis of left shoulder    4. Arthritis of left acromioclavicular joint    5. Rupture of proximal biceps tendon, left, subsequent encounter    6. Primary osteoarthritis of left shoulder        Plan:  Recommend over the counter anti-inflammatories for pain and/or swelling  Work-related injury left shoulder with the interval development of glenohumeral arthritis--we have discussed the treatment options and alternatives with the patient going forward.  We frankly told him that the outcomes of arthroplasty and then giving  him the greenlight to go back to heavy lifting and pulling are quite poor overall.  That may be necessary down the road but it would be best served once he is no longer asked to do heavy lifting.  I think a better option today would be to try to get his symptoms under control and he will be sent for an FCE to get some guidance on permanent restrictions with the shoulder.  If they cannot be honored, he may be seeking disability.  If that can be honored then this would be a win win situation.    Procedure Note:    I discussed with the patient the potential benefits of performing a therapeutic injections as well as potential risks including but not limited to infection, swelling, pain, bleeding, bruising, nerve/vessel damage, skin color changes, transient elevation in blood glucose levels, and fat atrophy. After informed consent and after the areas were prepped with chlorhexadine soap, ethyl chloride was used to numb the skin. Via the anterior approach, 3mL of 1% lidocaine followed by 40mg of Kenalog were each injected into the glenohumeral joint of the left shoulder. The patient tolerated the procedure well. There were no complications. A sterile dressing was placed over the injection sites.        Jeremy Fan MD  02/02/23  13:27 EST      Dictated Utilizing Dragon Dictation.

## 2023-02-20 ENCOUNTER — TREATMENT (OUTPATIENT)
Dept: PHYSICAL THERAPY | Facility: CLINIC | Age: 58
End: 2023-02-20
Payer: OTHER MISCELLANEOUS

## 2023-02-20 DIAGNOSIS — M25.512 CHRONIC LEFT SHOULDER PAIN: Primary | ICD-10-CM

## 2023-02-20 DIAGNOSIS — G89.29 CHRONIC LEFT SHOULDER PAIN: Primary | ICD-10-CM

## 2023-02-20 PROCEDURE — 97750 PHYSICAL PERFORMANCE TEST: CPT | Performed by: PHYSICAL THERAPIST

## 2023-02-20 NOTE — PROGRESS NOTES
[facility]         [address], [address]              [place] [address]    Please refer to the Media Section for the full FCE report.     KIERAN Good PT, DPT [id number]

## 2023-02-21 ENCOUNTER — TELEPHONE (OUTPATIENT)
Dept: ORTHOPEDIC SURGERY | Facility: CLINIC | Age: 58
End: 2023-02-21

## 2023-02-21 NOTE — TELEPHONE ENCOUNTER
Provider: DR ESCALONA    Caller: JOVANI THORNTON    Relationship to Patient: SELF    Phone Number: [phone]    Reason for Call: PT CALLING TO SAY THAT HE HAD HIS EVAL WITH PHYSICAL THERAPY, AND NEEDS TO FOLLOW UP WITH DR ESCALONA TO GO OVER HIS RESTRICTIONS AND LIMITATIONS FOR RETURNING TO WORK. HE SAID THAT THIS NEEDS TO BE WITHIN 10-15 DAYS, [employer] APPT [facility]UB HAS IS 3/16. PLEASE CALL THE PATIENT BACK TO SCHEDULE.

## 2023-02-24 ENCOUNTER — TELEPHONE (OUTPATIENT)
Dept: ORTHOPEDIC SURGERY | Facility: CLINIC | Age: 58
End: 2023-02-24

## 2023-03-16 ENCOUNTER — OFFICE VISIT (OUTPATIENT)
Dept: ORTHOPEDIC SURGERY | Facility: CLINIC | Age: 58
End: 2023-03-16
Payer: OTHER MISCELLANEOUS

## 2023-03-16 VITALS
WEIGHT: 192.8 LBS | BODY MASS INDEX: 29.22 KG/M2 | DIASTOLIC BLOOD PRESSURE: 100 MMHG | HEIGHT: 68 IN | SYSTOLIC BLOOD PRESSURE: 130 MMHG

## 2023-03-16 DIAGNOSIS — M75.02 ADHESIVE CAPSULITIS OF LEFT SHOULDER: ICD-10-CM
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
## 2023-03-16 DIAGNOSIS — M19.012 ARTHRITIS OF LEFT ACROMIOCLAVICULAR JOINT: ICD-10-CM

## 2023-03-16 DIAGNOSIS — S46.212D RUPTURE OF PROXIMAL BICEPS TENDON, LEFT, SUBSEQUENT ENCOUNTER: ICD-10-CM

## 2023-03-16 DIAGNOSIS — M19.012 PRIMARY OSTEOARTHRITIS OF LEFT SHOULDER: ICD-10-CM

## 2023-03-16 DIAGNOSIS — Z98.890 S/P ARTHROSCOPY OF SHOULDER: Primary | ICD-10-CM

## 2023-03-16 PROCEDURE — 99213 OFFICE O/P EST LOW 20 MIN: CPT | Performed by: ORTHOPAEDIC SURGERY

## 2023-03-16 RX ORDER — LEVOTHYROXINE SODIUM 0.07 MG/1
75 TABLET ORAL DAILY
COMMUNITY
Start: 2023-03-06

## 2023-03-16 NOTE — PROGRESS NOTES
"    [facility] [place] Orthopaedic Surgery Clinic Note        Subjective     CC: Follow-up (6 week follow up- 9.5 months s/p (left shoulder arthroscopy with subacromial decompression, major debridement of superior labrum anterior to posterior, debridement of the biceps stump, and chondroplasty of the glenoid and humeral head 6/3/22))      HPI    Micah Viera is a 57 y.o. male.  Patient returns with his son today for follow-up after the FCE done by Janusz Good.  He tells me that the injection we gave him in the left glenohumeral joint on 2/2/2023 helped him for about 4 weeks only.  He still complaining of pain and popping in the shoulder.    Overall, patient's symptoms are as above    ROS:    Constiutional:Pt denies fever, chills, nausea, or vomiting.  MSK:as above        Objective      Past Medical History  Past Medical History:   Diagnosis Date   • Arthritis of back      Social History     Socioeconomic History   • Marital status:    Tobacco Use   • Smoking status: Never   • Smokeless tobacco: Current     Types: Snuff   Vaping Use   • Vaping Use: Never used   Substance and Sexual Activity   • Alcohol use: Yes   • Drug use: Never   • Sexual activity: Defer          Physical Exam  /100   Ht 172.7 cm (67.99\")   Wt 87.5 kg (192 lb 12.8 oz)   BMI 29.32 kg/m²     Body mass index is 29.32 kg/m².    Patient is well nourished and well developed.        Ortho Exam  Forward elevation 130 degrees   external rotation 65 degrees  Internal rotation 50 degrees  Abduction 110 degrees  5 out of 5 deltoid, bicep, tricep, forward elevation, and external rotation and subscap    Imaging/Labs/EMG Reviewed:  Imaging Results (Last 24 Hours)     ** No results found for the last 24 hours. **        We have reviewed the report of the FCE created by Janusz Good PT.  Patient is not cleared to return to heavy and very heavy work or overhead duty.  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    Assessment  "   Assessment:  1. S/P arthroscopy of shoulder    2. Work related injury    3. Adhesive capsulitis of left shoulder    4. Arthritis of left acromioclavicular joint    5. Rupture of proximal biceps tendon, left, subsequent encounter    6. Primary osteoarthritis of left shoulder        Plan:  Recommend over the counter anti-inflammatories for pain and/or swelling  Work-related left shoulder injury with glenohumeral arthritis, adhesive capsulitis that has resolved, rupture of the long of the biceps tendon--patient will be released to work on 3/27/2023 with the above restrictions as laid out by his FCE.  I will see him back as needed going forward.  He will be at [facility] today.  He will discuss with his work whether or not they can honor his restrictions.      Jeremy Fan MD  03/16/23  09:00 EDT      Dictated Utilizing Dragon Dictation.

## 2023-03-21 ENCOUNTER — TELEPHONE (OUTPATIENT)
Dept: ORTHOPEDIC SURGERY | Facility: CLINIC | Age: 58
End: 2023-03-21
Payer: OTHER MISCELLANEOUS

## 2023-03-21 DIAGNOSIS — M19.012 PRIMARY OSTEOARTHRITIS OF LEFT SHOULDER: Primary | ICD-10-CM
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
## 2023-03-21 NOTE — TELEPHONE ENCOUNTER
Referral made to Dr. Guzman at  orthopedics.  Please make sure that the patient understands that after our review of his radiographs and MRI, we do not think that the results of arthroplasty will be favorable or in the patient's long-term best interest at this point in time.

## 2023-03-21 NOTE — TELEPHONE ENCOUNTER
Provider: POLA  Caller: JOVANI  Relationship to Patient: SELF  Pharmacy:   Phone Number: [phone]  Reason for Call: PT CALLING TO ASK ABOUT SHOULDER REPLACEMENT SX AND WHO DR. ESCALONA TOLD HIM ABOUT

## 2023-03-21 NOTE — TELEPHONE ENCOUNTER
Patient called and reports that he will need a referral for his claim adjustor in regards to having surgery done by Dr. Louie. You can call him at [phone]

## 2023-03-21 NOTE — TELEPHONE ENCOUNTER
I briefly discussed the case with Dr. Louie.  If he would like to see him, we certainly can arrangement for it.  His  may also be able to recommend a shoulder arthroplasty surgeon.    Thank you    ANITA

## 2023-03-21 NOTE — TELEPHONE ENCOUNTER
Called patient back and let him know that we cannot take him off work as we have put him at [facility]. If his workplace cannot accommodate the restrictions, then they can choose to keep him off. I let him know to contact his  and see who they would prefer him to see for possible shoulder arthroplasty. He understood.     Jacey

## 2023-03-22 NOTE — TELEPHONE ENCOUNTER
Called patient and made him aware of referral to  orthopedics. I faxed referral to his  per his request.     Jacey

## 2023-05-31 ENCOUNTER — TRANSCRIBE ORDERS (OUTPATIENT)
Dept: PHYSICAL THERAPY | Facility: CLINIC | Age: 58
End: 2023-05-31

## 2023-05-31 DIAGNOSIS — S46.012A TRAUMATIC INCOMPLETE TEAR OF LEFT ROTATOR CUFF, INITIAL ENCOUNTER: Primary | ICD-10-CM

## 2023-06-05 ENCOUNTER — TREATMENT (OUTPATIENT)
Dept: PHYSICAL THERAPY | Facility: CLINIC | Age: 58
End: 2023-06-05
Payer: OTHER MISCELLANEOUS

## 2023-06-05 DIAGNOSIS — M25.512 CHRONIC LEFT SHOULDER PAIN: Primary | ICD-10-CM

## 2023-06-05 DIAGNOSIS — Z98.890 S/P LEFT ROTATOR CUFF REPAIR: ICD-10-CM

## 2023-06-05 DIAGNOSIS — G89.29 CHRONIC LEFT SHOULDER PAIN: Primary | ICD-10-CM

## 2023-06-05 PROCEDURE — 97161 PT EVAL LOW COMPLEX 20 MIN: CPT | Performed by: PHYSICAL THERAPIST

## 2023-06-05 PROCEDURE — 97140 MANUAL THERAPY 1/> REGIONS: CPT | Performed by: PHYSICAL THERAPIST

## 2023-06-05 NOTE — PROGRESS NOTES
Physical Therapy Initial Evaluation and Plan of Care   [address] [place] [address]      Patient: Micah Viera   : 1965  Diagnosis/ICD-10 Code:  Chronic left shoulder pain [M25.512, G89.29]  Referring practitioner: Jeremy Fan,*    Subjective Evaluation    History of Present Illness  Date of surgery: 2023  Mechanism of injury: Pt reports having a lot of pain since injuring his L shoulder at work last year. Pt reports that he had arthroscopic surgery on the L shoulder and continued to have pain. Pt reports that he got an FCE and was dropped by worker's comp. Pt reports that he was still in so much pain, that he went and got a second opinion. Pt reports that he was told he had torn RC and had surgery to repair supraspinatus and subscapularis and already his pain is better. Pt reports that he is wearing his sling and will be for another month. Pt reports that he has a  and is very frustrated with his former surgeon and worker's comp. Pt reports sleeping in the recliner.       Patient Occupation:  Pain  Current pain ratin  At best pain ratin  Quality: burning and sharp  Relieving factors: ice and support  Aggravating factors: overhead activity, sleeping, prolonged positioning, movement, lifting and outstretched reach  Progression: improved    Social Support  Lives with: spouse    Hand dominance: right    Diagnostic Tests  X-ray: abnormal  MRI studies: abnormal    Treatments  Previous treatment: medication and physical therapy  Patient Goals  Patient goals for therapy: decreased pain, increased motion and increased strength           Objective          Postural Observations  Seated posture: poor  Standing posture: poor      Palpation   Left   No palpable tenderness to the upper trapezius.   Tenderness of the anterior deltoid.     Tenderness     Left Shoulder   Tenderness in the biceps tendon (proximal).     Passive Range of Motion   Left Shoulder    Flexion: 85 degrees   Abduction: 93 degrees   External rotation 90°: 55 degrees   Internal rotation 90°: 52 degrees     Right Shoulder   Flexion: 180 degrees   Abduction: 180 degrees   External rotation 90°: 90 degrees   Internal rotation 90°: 60 degrees     Additional Passive Range of Motion Details  PROM improved to 115 degrees flexion with stretching        Assessment & Plan     Assessment  Impairments: abnormal or restricted ROM, activity intolerance, impaired physical strength, lacks appropriate home exercise program and pain with function  Functional Limitations: carrying objects, lifting, sleeping, pushing, uncomfortable because of pain, moving in bed, reaching behind back and reaching overhead  Assessment details: Patient is a 57 year old male who comes to physical therapy following L RC repair. Signs and symptoms are consistent with RC surgery resulting in pain, decreased ROM, decreased strength, and inability to perform all essential functional activities. Pt will benefit from skilled PT services to address the above issues.         Barriers to therapy: history of shoulder surgery and pain  Prognosis: fair    Goals  Plan Goals: SHORT TERM GOALS:     4 weeks  1. Pt I w/ HEP  2. Pt to demonstrate PROM of the left shoulder to WFL to improve ability to perform ADL's  3. Pt to demonstrate ability to perform 30 minutes continuous activity in the clinic without increase in pain     LONG TERM GOALS:   6 weeks  1. Pt to demonstrate AROM of the left shoulder to WNL to allow ability to perform all necessary functional activities  2. Pt to demonstrate ability to lift 5# OH with the left arm without increase in pain  3. Pt to report being able to return to all daily activities without pain in the L shoulder  4. Pt to tolerate 60 minutes continuous activity in the clinic without increase in pain         Plan  Therapy options: will be seen for skilled therapy services  Planned modality interventions:  cryotherapy  Planned therapy interventions: body mechanics training, flexibility, functional ROM exercises, home exercise program, joint mobilization, manual therapy, motor coordination training, neuromuscular re-education, postural training, soft tissue mobilization, spinal/joint mobilization, strengthening, stretching and therapeutic activities  Frequency: 2x week  Duration in weeks: 12  Treatment plan discussed with: patient      Timed Treatment:  Manual Therapy:    10     mins  60644;  Therapeutic Exercise:         mins  35471;     Neuromuscular Jessee:        mins  83480;    Therapeutic Activity:          mins  88831;     Gait Training:           mins  46953;     Ultrasound:          mins  28500;    Electrical Stimulation:         mins  60180 ( );  Dry Needling          mins self-pay  Iontophoresis          mins 01572    Untimed Treatments:  Electrical Stimulation:         mins  52406 ( );  Dry Needling:                     mins  Ultrasound:                         mins  24876;      Timed Treatment:   10   mins   Total Treatment:     50   mins    PT SIGNATURE: MARTIN Alvarez License: [id number]  DATE TREATMENT INITIATED: 6/5/2023    Initial Certification  Certification Period: 9/2/2023  I certify that the therapy services are furnished while this patient is under my care.  The services outlined above are required by this patient, and will be reviewed every 90 days.     PHYSICIAN: Jeremy Fan MD      DATE:     Please sign and return via fax to [phone].. Thank you, [facility].

## 2023-06-08 ENCOUNTER — TREATMENT (OUTPATIENT)
Dept: PHYSICAL THERAPY | Facility: CLINIC | Age: 58
End: 2023-06-08
Payer: OTHER MISCELLANEOUS

## 2023-06-08 DIAGNOSIS — G89.29 CHRONIC LEFT SHOULDER PAIN: Primary | ICD-10-CM

## 2023-06-08 DIAGNOSIS — M25.512 CHRONIC LEFT SHOULDER PAIN: Primary | ICD-10-CM

## 2023-06-08 PROCEDURE — 97110 THERAPEUTIC EXERCISES: CPT | Performed by: PHYSICAL THERAPIST

## 2023-06-08 PROCEDURE — 97140 MANUAL THERAPY 1/> REGIONS: CPT | Performed by: PHYSICAL THERAPIST

## 2023-06-08 NOTE — PROGRESS NOTES
Physical Therapy Daily Treatment Note      Visit #: 2    Micah Viera reports 0/10 pain today at rest.  Pt reports that he is not having much pain but the shoulder stays sore. Pt reports that the HEP has been going well. Pt reports that he slept without his sling per surgeon's permission last night and seemed to be more comfortable. Pt reports that he does have tightness and irritation through the L scapula today.         Objective Pt present to PT today with no distress at rest.     Pt with some catching in the L shoulder with PROM and stretching today which lessened as the L shoulder relaxed.     Pt with no increased pain in the L shoulder with activities today.       See Exercise, Manual, and Modality Logs for complete treatment.     Assessment/Plan  Pt continues to have less pain since surgery. Pt to continue with PT to help improve scapular mobility, shoulder motion, and shoulder pain with daily activities.       Progress per Plan of Care      Visit Diagnosis:    ICD-10-CM ICD-9-CM   1. Chronic left shoulder pain  M25.512 719.41    G89.29 338.29              Timed Treatment:  Manual Therapy:    28     mins  98936;  Therapeutic Exercise:    12     mins  79921;     Neuromuscular Jessee:        mins  08721;    Therapeutic Activity:          mins  96732;     Gait Training:           mins  39503;     Ultrasound:          mins  24133;    Electrical Stimulation:         mins  70896 ( );  Dry Needling          mins self-pay  Iontophoresis          mins 02822    Untimed Treatments:  Electrical Stimulation:         mins  47689 (MC );  Dry Needling:                     mins  Ultrasound:                         mins  08423;        Timed Treatment:      40 mins   Total Treatment:     50   mins    Cj Wallace, PT  Physical Therapist

## 2023-06-12 ENCOUNTER — TREATMENT (OUTPATIENT)
Dept: PHYSICAL THERAPY | Facility: CLINIC | Age: 58
End: 2023-06-12
Payer: OTHER MISCELLANEOUS

## 2023-06-12 DIAGNOSIS — Z98.890 S/P LEFT ROTATOR CUFF REPAIR: ICD-10-CM

## 2023-06-12 DIAGNOSIS — M25.512 CHRONIC LEFT SHOULDER PAIN: Primary | ICD-10-CM

## 2023-06-12 DIAGNOSIS — G89.29 CHRONIC LEFT SHOULDER PAIN: Primary | ICD-10-CM

## 2023-06-12 PROCEDURE — 97140 MANUAL THERAPY 1/> REGIONS: CPT | Performed by: PHYSICAL THERAPIST

## 2023-06-12 PROCEDURE — 97110 THERAPEUTIC EXERCISES: CPT | Performed by: PHYSICAL THERAPIST

## 2023-06-12 NOTE — PROGRESS NOTES
Physical Therapy Daily Treatment Note      Visit #: 3    Micah Viera reports 2/10 pain today at rest.  Pt reports that he felt alright after last session without increased pain. Pt reports that his HEP has been going alright. Pt reports that his L shoulder feels a little tight today and bruised.         Objective Pt present to PT today with no distress at rest.     Pt with some catching in the L shoulder with stretching today.     Pt with no increased pain following activities today.       See Exercise, Manual, and Modality Logs for complete treatment.     Assessment/Plan  Pt continues to have decreased motion in the L shoulder although his mobility has improved. Pt to continue with L shoulder activities to improve mobility and function as able following surgery.       Progress per Plan of Care      Visit Diagnosis:    ICD-10-CM ICD-9-CM   1. Chronic left shoulder pain  M25.512 719.41    G89.29 338.29   2. S/P left rotator cuff repair  Z98.890 V45.89              Timed Treatment:  Manual Therapy:    25     mins  09757;  Therapeutic Exercise:    15     mins  82456;     Neuromuscular Jessee:        mins  53285;    Therapeutic Activity:          mins  24931;     Gait Training:           mins  43420;     Ultrasound:          mins  91586;    Electrical Stimulation:         mins  90974 ( );  Dry Needling          mins self-pay  Iontophoresis          mins 82752    Untimed Treatments:  Electrical Stimulation:         mins  36973 (MC );  Dry Needling:                     mins  Ultrasound:                         mins  06677;        Timed Treatment:   40   mins   Total Treatment:     50   mins    Cj Wallace, PT  Physical Therapist

## 2023-06-14 ENCOUNTER — TREATMENT (OUTPATIENT)
Dept: PHYSICAL THERAPY | Facility: CLINIC | Age: 58
End: 2023-06-14
Payer: OTHER MISCELLANEOUS

## 2023-06-14 DIAGNOSIS — M25.512 CHRONIC LEFT SHOULDER PAIN: Primary | ICD-10-CM

## 2023-06-14 DIAGNOSIS — G89.29 CHRONIC LEFT SHOULDER PAIN: Primary | ICD-10-CM

## 2023-06-14 DIAGNOSIS — Z98.890 S/P LEFT ROTATOR CUFF REPAIR: ICD-10-CM

## 2023-06-14 PROCEDURE — 97110 THERAPEUTIC EXERCISES: CPT | Performed by: PHYSICAL THERAPIST

## 2023-06-14 PROCEDURE — 97140 MANUAL THERAPY 1/> REGIONS: CPT | Performed by: PHYSICAL THERAPIST

## 2023-06-14 NOTE — PROGRESS NOTES
Physical Therapy Daily Treatment Note      Visit #: 4    Micah Viera reports 0/10 pain today at rest.  Pt reports that his L shoulder stays sore and had a little more soreness following last session although overall, his shoulder is feeling better despite not having much function.         Objective Pt present to PT today with no distress at rest.     Pt with popping in the L shoulder with PROM and stretching today around 90 degrees with flexion and returning to rest.     Pt with no increased pain following activities today.       See Exercise, Manual, and Modality Logs for complete treatment.     Assessment/Plan  Pt continues to have good motion in the L shoulder following RC repair although still has limited function due to surgery. Pt to continue to protect repair and will progress as able to improve motion and function.       Progress per Plan of Care      Visit Diagnosis:    ICD-10-CM ICD-9-CM   1. Chronic left shoulder pain  M25.512 719.41    G89.29 338.29   2. S/P left rotator cuff repair  Z98.890 V45.89              Timed Treatment:  Manual Therapy:    20     mins  41533;  Therapeutic Exercise:    25     mins  06350;     Neuromuscular Jessee:        mins  42782;    Therapeutic Activity:          mins  59900;     Gait Training:           mins  27439;     Ultrasound:          mins  05523;    Electrical Stimulation:         mins  10566 ( );  Dry Needling          mins self-pay  Iontophoresis          mins 25302    Untimed Treatments:  Electrical Stimulation:         mins  19297 ( );  Dry Needling:                     mins  Ultrasound:                         mins  93339;        Timed Treatment:   45   mins   Total Treatment:     55   mins    Cj Wallace, PT  Physical Therapist

## 2023-06-19 ENCOUNTER — TELEPHONE (OUTPATIENT)
Dept: PHYSICAL THERAPY | Facility: CLINIC | Age: 58
End: 2023-06-19
Payer: OTHER MISCELLANEOUS

## 2023-06-19 NOTE — TELEPHONE ENCOUNTER
Caller: Micah Viera    Relationship: Self       What was the call regarding: NOT FEELING WELL

## 2023-07-25 ENCOUNTER — TREATMENT (OUTPATIENT)
Dept: PHYSICAL THERAPY | Facility: CLINIC | Age: 58
End: 2023-07-25
Payer: OTHER MISCELLANEOUS

## 2023-07-25 DIAGNOSIS — G89.29 CHRONIC LEFT SHOULDER PAIN: Primary | ICD-10-CM

## 2023-07-25 DIAGNOSIS — M25.512 CHRONIC LEFT SHOULDER PAIN: Primary | ICD-10-CM

## 2023-07-25 DIAGNOSIS — Z98.890 S/P LEFT ROTATOR CUFF REPAIR: ICD-10-CM

## 2023-07-25 PROCEDURE — 97112 NEUROMUSCULAR REEDUCATION: CPT | Performed by: PHYSICAL THERAPIST

## 2023-07-25 PROCEDURE — 97110 THERAPEUTIC EXERCISES: CPT | Performed by: PHYSICAL THERAPIST

## 2023-07-25 PROCEDURE — 97140 MANUAL THERAPY 1/> REGIONS: CPT | Performed by: PHYSICAL THERAPIST

## 2023-07-27 ENCOUNTER — TREATMENT (OUTPATIENT)
Dept: PHYSICAL THERAPY | Facility: CLINIC | Age: 58
End: 2023-07-27
Payer: OTHER MISCELLANEOUS

## 2023-07-27 DIAGNOSIS — M25.512 CHRONIC LEFT SHOULDER PAIN: Primary | ICD-10-CM

## 2023-07-27 DIAGNOSIS — Z98.890 S/P LEFT ROTATOR CUFF REPAIR: ICD-10-CM

## 2023-07-27 DIAGNOSIS — G89.29 CHRONIC LEFT SHOULDER PAIN: Primary | ICD-10-CM

## 2023-07-27 PROCEDURE — 97110 THERAPEUTIC EXERCISES: CPT | Performed by: PHYSICAL THERAPIST

## 2023-07-27 PROCEDURE — 97112 NEUROMUSCULAR REEDUCATION: CPT | Performed by: PHYSICAL THERAPIST

## 2023-07-27 PROCEDURE — 97140 MANUAL THERAPY 1/> REGIONS: CPT | Performed by: PHYSICAL THERAPIST

## 2023-07-27 NOTE — PROGRESS NOTES
Physical Therapy Daily Treatment Note      Visit #: 11    Micah Viera reports 0/10 pain today at rest.  Pt reports that he has been sore because he has been doing more around the house. Pt reports that he has been using the mower and weed eater around his yard although his L shoulder is not taking too much strain. Pt reports that his L shoulder is a little sore after the yard work but not painful, and he ices the shoulder following activity.         Objective Pt present to PT today with no distress at rest.     Pt able to complete all activities today without increased pain.     Pt reported some muscle burning but no increased pain.       See Exercise, Manual, and Modality Logs for complete treatment.     Assessment/Plan  Pt continues to do well with progression of strengthening and stabilization activities to improve L shoulder function. Pt to continue with PT to help improve L shoulder strength, activity tolerance, and pain free function.       Progress per Plan of Care      Visit Diagnosis:    ICD-10-CM ICD-9-CM   1. Chronic left shoulder pain  M25.512 719.41    G89.29 338.29   2. S/P left rotator cuff repair  Z98.890 V45.89              Timed Treatment:  Manual Therapy:     12     mins  19898;  Therapeutic Exercise:    20     mins  59009;     Neuromuscular Jessee:    10    mins  58766;    Therapeutic Activity:          mins  23616;     Gait Training:           mins  25010;     Ultrasound:          mins  86973;    Electrical Stimulation:         mins  13222 ( );  Dry Needling          mins self-pay  Iontophoresis          mins 36263    Untimed Treatments:  Electrical Stimulation:         mins  79496 ( );  Dry Needling:                     mins  Ultrasound:                         mins  61079;        Timed Treatment:   42   mins   Total Treatment:     42   mins    Cj Wallace, PT  Physical Therapist

## 2023-07-27 NOTE — PROGRESS NOTES
Physical Therapy Daily Treatment Note      Visit #: 10    Micah Viera reports 0/10 pain today at rest.  Pt reports that his shoulder has been doing well and that he has not been having much pain. Pt reports soreness and some ratcheting in the shoulder although minimal if any pain.         Objective Pt present to PT today with no distress at rest.     Pt with minimal limitation in PROM due to tightness and pain.     Pt with no increased pain with activities in the clinic to help improve strength and mobility.       See Exercise, Manual, and Modality Logs for complete treatment.     Assessment/Plan  Pt continues to have decreased mobility and strength in the L shoulder. Pt to continue with PT to help improve L shoulder function, activity tolerance, and strength.       Progress per Plan of Care      Visit Diagnosis:    ICD-10-CM ICD-9-CM   1. Chronic left shoulder pain  M25.512 719.41    G89.29 338.29   2. S/P left rotator cuff repair  Z98.890 V45.89              Timed Treatment:  Manual Therapy:    13     mins  69395;  Therapeutic Exercise:    20     mins  50246;     Neuromuscular Jessee:    16    mins  98211;    Therapeutic Activity:          mins  84050;     Gait Training:           mins  66604;     Ultrasound:          mins  46184;    Electrical Stimulation:         mins  35961 ( );  Dry Needling          mins self-pay  Iontophoresis          mins 77355    Untimed Treatments:  Electrical Stimulation:         mins  13921 (MC );  Dry Needling:                     mins  Ultrasound:                         mins  93024;        Timed Treatment:   49   mins   Total Treatment:     59   mins    Cj Wallace, PT  Physical Therapist

## 2023-08-01 ENCOUNTER — TREATMENT (OUTPATIENT)
Dept: PHYSICAL THERAPY | Facility: CLINIC | Age: 58
End: 2023-08-01
Payer: OTHER MISCELLANEOUS

## 2023-08-01 DIAGNOSIS — Z98.890 S/P LEFT ROTATOR CUFF REPAIR: ICD-10-CM

## 2023-08-01 DIAGNOSIS — M25.512 CHRONIC LEFT SHOULDER PAIN: Primary | ICD-10-CM

## 2023-08-01 DIAGNOSIS — G89.29 CHRONIC LEFT SHOULDER PAIN: Primary | ICD-10-CM

## 2023-08-01 PROCEDURE — 97110 THERAPEUTIC EXERCISES: CPT | Performed by: PHYSICAL THERAPIST

## 2023-08-01 PROCEDURE — 97140 MANUAL THERAPY 1/> REGIONS: CPT | Performed by: PHYSICAL THERAPIST

## 2023-08-01 PROCEDURE — 97112 NEUROMUSCULAR REEDUCATION: CPT | Performed by: PHYSICAL THERAPIST

## 2023-08-23 ENCOUNTER — TRANSCRIBE ORDERS (OUTPATIENT)
Dept: PHYSICAL THERAPY | Facility: CLINIC | Age: 58
End: 2023-08-23
Payer: OTHER MISCELLANEOUS

## 2023-08-23 DIAGNOSIS — Z98.890 S/P ARTHROSCOPY OF LEFT SHOULDER: Primary | ICD-10-CM

## 2023-08-30 ENCOUNTER — TREATMENT (OUTPATIENT)
Dept: PHYSICAL THERAPY | Facility: CLINIC | Age: 58
End: 2023-08-30
Payer: OTHER MISCELLANEOUS

## 2023-08-30 DIAGNOSIS — Z98.890 S/P LEFT ROTATOR CUFF REPAIR: ICD-10-CM

## 2023-08-30 DIAGNOSIS — M25.512 CHRONIC LEFT SHOULDER PAIN: Primary | ICD-10-CM

## 2023-08-30 DIAGNOSIS — G89.29 CHRONIC LEFT SHOULDER PAIN: Primary | ICD-10-CM

## 2023-08-30 NOTE — PROGRESS NOTES
Physical Therapy Daily Treatment Note      Visit #: 13    Micah Viera reports 0/10 pain today at rest.  Pt reports that his L shoulder stays sore although it has a lot less pain. Pt reports that his R shoulder has felt worn out and catches every now and then.         Objective Pt present to PT today with no distress at rest.     Pt with burning in the L shoulder with activities today.     Pt with less catching and popping today in the L shoulder.       See Exercise, Manual, and Modality Logs for complete treatment.     Assessment/Plan  Pt continues to have L shoulder weakness and decreased function. Pt to continue with PT to help improve L shoulder stability, strength, and daily function.       Progress per Plan of Care      Visit Diagnosis:    ICD-10-CM ICD-9-CM   1. Chronic left shoulder pain  M25.512 719.41    G89.29 338.29   2. S/P left rotator cuff repair  Z98.890 V45.89              Timed Treatment:  Manual Therapy:    11     mins  76723;  Therapeutic Exercise:    24     mins  62397;     Neuromuscular Jessee:    16    mins  98823;    Therapeutic Activity:          mins  02178;     Gait Training:           mins  92603;     Ultrasound:          mins  03385;    Electrical Stimulation:         mins  14393 ( );  Dry Needling          mins self-pay  Iontophoresis          mins 99050    Untimed Treatments:  Electrical Stimulation:         mins  98757 (MC );  Dry Needling:                     mins  Ultrasound:                         mins  47247;        Timed Treatment:   51   mins   Total Treatment:     51   mins    Cj Wallace, PT  Physical Therapist

## 2023-09-26 ENCOUNTER — TREATMENT (OUTPATIENT)
Dept: PHYSICAL THERAPY | Facility: CLINIC | Age: 58
End: 2023-09-26
Payer: OTHER MISCELLANEOUS

## 2023-09-26 DIAGNOSIS — G89.29 CHRONIC LEFT SHOULDER PAIN: Primary | ICD-10-CM

## 2023-09-26 DIAGNOSIS — M25.512 CHRONIC LEFT SHOULDER PAIN: Primary | ICD-10-CM

## 2023-09-26 DIAGNOSIS — Z98.890 S/P LEFT ROTATOR CUFF REPAIR: ICD-10-CM

## 2023-09-26 NOTE — PROGRESS NOTES
Physical Therapy Daily Treatment Note      Visit #: 14    Micah Viera reports 0/10 pain today at rest.  Pt reports that his L shoulder stays sore and just feels tight. Pt reports that he has been working a lot with the bands at home and doing as much exercise as he knows how to do.         Objective Pt present to PT today with no distress at rest.     Pt slightly limited with ER with some pain in the back of the shoulder with stretching.     Pt with good motion in the flexion, abd, and IR of the L shoulder.     Pt with some burning with exercises today.     Pt given verbal and tactile cues to avoid pain with standing ER today.       See Exercise, Manual, and Modality Logs for complete treatment.     Assessment/Plan  Pt continues to have some pain with L shoulder function and continued weakness. Pt to continue with PT to help improve activity tolerance, functional mobility, and stability of the L shoulder.       Progress per Plan of Care      Visit Diagnosis:    ICD-10-CM ICD-9-CM   1. Chronic left shoulder pain  M25.512 719.41    G89.29 338.29   2. S/P left rotator cuff repair  Z98.890 V45.89              Timed Treatment:  Manual Therapy:    16     mins  23484;  Therapeutic Exercise:    25     mins  13693;     Neuromuscular Jessee:    12    mins  99146;    Therapeutic Activity:          mins  40876;     Gait Training:           mins  56729;     Ultrasound:          mins  31245;    Electrical Stimulation:         mins  65420 ( );  Dry Needling          mins self-pay  Iontophoresis          mins 89663    Untimed Treatments:  Electrical Stimulation:         mins  69151 ( );  Dry Needling:                     mins  Ultrasound:                         mins  47642;        Timed Treatment:   53   mins   Total Treatment:     53   mins    Cj Wallace, PT  Physical Therapist

## 2023-10-03 ENCOUNTER — TREATMENT (OUTPATIENT)
Dept: PHYSICAL THERAPY | Facility: CLINIC | Age: 58
End: 2023-10-03
Payer: OTHER MISCELLANEOUS

## 2023-10-03 DIAGNOSIS — G89.29 CHRONIC LEFT SHOULDER PAIN: Primary | ICD-10-CM

## 2023-10-03 DIAGNOSIS — M25.512 CHRONIC LEFT SHOULDER PAIN: Primary | ICD-10-CM

## 2023-10-03 DIAGNOSIS — Z98.890 S/P LEFT ROTATOR CUFF REPAIR: ICD-10-CM

## 2023-10-03 NOTE — PROGRESS NOTES
Physical Therapy Daily Treatment Note      Visit #: 15    Micah Viera reports 7/10 pain today at rest.  Pt reports that his L shoulder blade has been really hurting over the last couple days. Pt reports that he has worked on his rows and exercises at home and it started around Sunday night.         Objective Pt present to PT today with no distress at rest.     Pt with hypomobility through the cervical spine noted today. Pt with tenderness in the L periscapular muscles today.     Pt with no increased pain in the L shoulder with activities today.       See Exercise, Manual, and Modality Logs for complete treatment.     Assessment/Plan  Pt continues to do well with activities in the clinic to help improve L shoulder function, activity tolerance, and strength. Pt to continue and progress activities as tolerated.       Progress per Plan of Care      Visit Diagnosis:    ICD-10-CM ICD-9-CM   1. Chronic left shoulder pain  M25.512 719.41    G89.29 338.29   2. S/P left rotator cuff repair  Z98.890 V45.89              Timed Treatment:  Manual Therapy:    11     mins  22769;  Therapeutic Exercise:    25     mins  41866;     Neuromuscular Jessee:    10    mins  49846;    Therapeutic Activity:          mins  49510;     Gait Training:           mins  04880;     Ultrasound:          mins  35498;    Electrical Stimulation:         mins  80334 ( );  Dry Needling          mins self-pay  Iontophoresis          mins 92426    Untimed Treatments:  Electrical Stimulation:         mins  20054 ( );  Dry Needling:                     mins  Ultrasound:                         mins  15955;        Timed Treatment:   46   mins   Total Treatment:     46   mins    Cj Wallace, PT  Physical Therapist

## 2023-10-05 ENCOUNTER — TREATMENT (OUTPATIENT)
Dept: PHYSICAL THERAPY | Facility: CLINIC | Age: 58
End: 2023-10-05
Payer: OTHER MISCELLANEOUS

## 2023-10-05 DIAGNOSIS — M25.512 CHRONIC LEFT SHOULDER PAIN: Primary | ICD-10-CM

## 2023-10-05 DIAGNOSIS — Z98.890 S/P LEFT ROTATOR CUFF REPAIR: ICD-10-CM

## 2023-10-05 DIAGNOSIS — G89.29 CHRONIC LEFT SHOULDER PAIN: Primary | ICD-10-CM

## 2023-10-05 NOTE — PROGRESS NOTES
Physical Therapy Daily Treatment Note      Visit #: 16    Micah Viera reports 7/10 pain today at rest.  Pt reports that his L shoulder blade felt better after last session although the pain returned the following day. Pt reports that he was sore in the lateral shoulder which only lasted about a day.         Objective Pt present to PT today with no distress at rest.     Pt with good motion in the L shoulder OH although some limitation in ER at 80-85 degrees of rotation.     Pt with some soreness and burning in the L shoulder with activities today.       See Exercise, Manual, and Modality Logs for complete treatment.     Assessment/Plan  Pt continues to have good motion in the L shoulder and is doing well with activities to help improve strengthen and stabilize the shoulder. Pt to continue with PT as tolerated to help improve L shoulder function, pain, and activity tolerance.       Progress per Plan of Care      Visit Diagnosis:    ICD-10-CM ICD-9-CM   1. Chronic left shoulder pain  M25.512 719.41    G89.29 338.29   2. S/P left rotator cuff repair  Z98.890 V45.89              Timed Treatment:  Manual Therapy:    13     mins  70272;  Therapeutic Exercise:    25     mins  52306;     Neuromuscular Jessee:    12    mins  20898;    Therapeutic Activity:          mins  78862;     Gait Training:           mins  61424;     Ultrasound:          mins  77704;    Electrical Stimulation:         mins  66456 ( );  Dry Needling          mins self-pay  Iontophoresis          mins 12149    Untimed Treatments:  Electrical Stimulation:         mins  90534 ( );  Dry Needling:                     mins  Ultrasound:                         mins  82442;        Timed Treatment:   50   mins   Total Treatment:     55   mins    Cj Wallace, PT  Physical Therapist

## 2023-10-10 ENCOUNTER — TREATMENT (OUTPATIENT)
Dept: PHYSICAL THERAPY | Facility: CLINIC | Age: 58
End: 2023-10-10
Payer: OTHER MISCELLANEOUS

## 2023-10-10 DIAGNOSIS — G89.29 CHRONIC LEFT SHOULDER PAIN: Primary | ICD-10-CM

## 2023-10-10 DIAGNOSIS — Z98.890 S/P LEFT ROTATOR CUFF REPAIR: ICD-10-CM

## 2023-10-10 DIAGNOSIS — M25.512 CHRONIC LEFT SHOULDER PAIN: Primary | ICD-10-CM

## 2023-10-10 PROCEDURE — 97140 MANUAL THERAPY 1/> REGIONS: CPT | Performed by: PHYSICAL THERAPIST

## 2023-10-10 PROCEDURE — 97112 NEUROMUSCULAR REEDUCATION: CPT | Performed by: PHYSICAL THERAPIST

## 2023-10-10 PROCEDURE — 97110 THERAPEUTIC EXERCISES: CPT | Performed by: PHYSICAL THERAPIST

## 2023-10-10 NOTE — PROGRESS NOTES
Physical Therapy Daily Treatment Note      Visit #: 17    Micah Viera reports 0/10 pain today at rest.  Pt reports that his L shoulder blade is feeling much better although the L shoulder is tight this morning.         Objective Pt present to PT today with no distress at rest.     Pt with notable bruise on the R shoulder from trying to  a cooler last week.     Pt with some soreness and irritation in the L shoulder.     Pt with limited ER with tightness and some pain.       See Exercise, Manual, and Modality Logs for complete treatment.     Assessment/Plan  Pt continues to do well with activities in the clinic to help improve L shoulder function, pain free activity tolerance, and stability. Pt to follow up next week to continue with activities as tolerated.       Progress per Plan of Care      Visit Diagnosis:    ICD-10-CM ICD-9-CM   1. Chronic left shoulder pain  M25.512 719.41    G89.29 338.29   2. S/P left rotator cuff repair  Z98.890 V45.89              Timed Treatment:  Manual Therapy:    9     mins  23900;  Therapeutic Exercise:    26     mins  99913;     Neuromuscular Jessee:    12    mins  26251;    Therapeutic Activity:          mins  31659;     Gait Training:           mins  00162;     Ultrasound:          mins  76145;    Electrical Stimulation:         mins  90366 ( );  Dry Needling          mins self-pay  Iontophoresis          mins 05021    Untimed Treatments:  Electrical Stimulation:         mins  31935 ( );  Dry Needling:                     mins  Ultrasound:                         mins  84496;        Timed Treatment:   47   mins   Total Treatment:     47   mins    Cj Wallace, PT  Physical Therapist

## 2023-10-17 ENCOUNTER — TREATMENT (OUTPATIENT)
Dept: PHYSICAL THERAPY | Facility: CLINIC | Age: 58
End: 2023-10-17
Payer: OTHER MISCELLANEOUS

## 2023-10-17 DIAGNOSIS — Z98.890 S/P LEFT ROTATOR CUFF REPAIR: ICD-10-CM

## 2023-10-17 DIAGNOSIS — G89.29 CHRONIC LEFT SHOULDER PAIN: Primary | ICD-10-CM

## 2023-10-17 DIAGNOSIS — M25.512 CHRONIC LEFT SHOULDER PAIN: Primary | ICD-10-CM

## 2023-10-17 NOTE — PROGRESS NOTES
Physical Therapy Daily Treatment Note      Visit #: 18    Micah Viera reports 0/10 pain today at rest.  Pt reports that the tennis ball has been able to help the shoulder blade pain a lot. Pt reports that the L shoulder has been feeling about the same.         Objective Pt present to PT today with no distress at rest.     Pt with some burning, discomfort, and fatigue in the L shoulder with activities although no increased pain following session.       See Exercise, Manual, and Modality Logs for complete treatment.     Assessment/Plan  Pt continues to do well with activities in the clinic to help improve L shoulder stability, pain free motion, and activity tolerance. Pt to progress with increased resistance and activities as tolerated.       Progress per Plan of Care      Visit Diagnosis:    ICD-10-CM ICD-9-CM   1. Chronic left shoulder pain  M25.512 719.41    G89.29 338.29   2. S/P left rotator cuff repair  Z98.890 V45.89              Timed Treatment:  Manual Therapy:    9     mins  19121;  Therapeutic Exercise:    25     mins  54512;     Neuromuscular Jessee:    12    mins  24405;    Therapeutic Activity:          mins  98855;     Gait Training:           mins  94114;     Ultrasound:          mins  52473;    Electrical Stimulation:         mins  57261 ( );  Dry Needling          mins self-pay  Iontophoresis          mins 31672    Untimed Treatments:  Electrical Stimulation:         mins  32522 (MC );  Dry Needling:                     mins  Ultrasound:                         mins  20245;        Timed Treatment:   46   mins   Total Treatment:     46   mins    Cj Wallace, PT  Physical Therapist

## 2023-10-19 ENCOUNTER — TREATMENT (OUTPATIENT)
Dept: PHYSICAL THERAPY | Facility: CLINIC | Age: 58
End: 2023-10-19
Payer: OTHER MISCELLANEOUS

## 2023-10-19 DIAGNOSIS — Z98.890 S/P LEFT ROTATOR CUFF REPAIR: ICD-10-CM

## 2023-10-19 DIAGNOSIS — M25.512 CHRONIC LEFT SHOULDER PAIN: Primary | ICD-10-CM

## 2023-10-19 DIAGNOSIS — G89.29 CHRONIC LEFT SHOULDER PAIN: Primary | ICD-10-CM

## 2023-10-19 NOTE — PROGRESS NOTES
Physical Therapy Daily Treatment Note      Visit #: 19    Micah Viera reports 0/10 pain today at rest.  Pt reports that he did pretty well today following last session. Pt states that his shoulder was a little sore and stiff following last session although no pain.         Objective Pt present to PT today with no distress at rest.     Pt with some pain and tightness in the L ER of the shoulder.     Pt with no increased pain in the L shoulder following last session.       See Exercise, Manual, and Modality Logs for complete treatment.     Assessment/Plan  Pt continues to have some limited motion in ER of the L shoulder although is doing well with all other aspects of PT. Pt is doing well with strengthening, stabilization activities, and functional mobility work. Pt to continue with PT through next week and return to the doctor.       Progress per Plan of Care      Visit Diagnosis:    ICD-10-CM ICD-9-CM   1. Chronic left shoulder pain  M25.512 719.41    G89.29 338.29   2. S/P left rotator cuff repair  Z98.890 V45.89              Timed Treatment:  Manual Therapy:    12     mins  19092;  Therapeutic Exercise:    25     mins  21089;     Neuromuscular Jessee:    14    mins  23657;    Therapeutic Activity:          mins  51898;     Gait Training:           mins  85036;     Ultrasound:          mins  37103;    Electrical Stimulation:         mins  62276 ( );  Dry Needling          mins self-pay  Iontophoresis          mins 64355    Untimed Treatments:  Electrical Stimulation:         mins  06034 ( );  Dry Needling:                     mins  Ultrasound:                         mins  81032;        Timed Treatment:   51   mins   Total Treatment:     51     mins    Cj Wallace, PT  Physical Therapist

## 2023-10-24 ENCOUNTER — TREATMENT (OUTPATIENT)
Dept: PHYSICAL THERAPY | Facility: CLINIC | Age: 58
End: 2023-10-24
Payer: OTHER MISCELLANEOUS

## 2023-10-24 DIAGNOSIS — Z98.890 S/P LEFT ROTATOR CUFF REPAIR: ICD-10-CM

## 2023-10-24 DIAGNOSIS — M25.512 CHRONIC LEFT SHOULDER PAIN: Primary | ICD-10-CM

## 2023-10-24 DIAGNOSIS — G89.29 CHRONIC LEFT SHOULDER PAIN: Primary | ICD-10-CM

## 2023-10-24 PROCEDURE — 97110 THERAPEUTIC EXERCISES: CPT | Performed by: PHYSICAL THERAPIST

## 2023-10-24 PROCEDURE — 97112 NEUROMUSCULAR REEDUCATION: CPT | Performed by: PHYSICAL THERAPIST

## 2023-10-24 PROCEDURE — 97140 MANUAL THERAPY 1/> REGIONS: CPT | Performed by: PHYSICAL THERAPIST

## 2023-10-24 NOTE — PROGRESS NOTES
Physical Therapy Daily Treatment Note      Visit #: 20    Micah Viera reports 0/10 pain today at rest.  Pt reports that he almost did not come to PT he was feeling so good today. Pt states that he shoulder has been doing well but still gets some tightness and irritation following sessions.         Objective Pt present to PT today with no distress at rest.     Pt with minimal tightness in the ER of the L shoulder.     Pt reports soreness and some tightness following session today.       See Exercise, Manual, and Modality Logs for complete treatment.     Assessment/Plan  Pt continues to do well with strengthening and activities to help improve L shoulder function. Pt to see his surgeon tomorrow and will discuss continuation of care. Pt is doing very well although still lacks strength OH. Pt to continue with PT later this week.       Progress per Plan of Care      Visit Diagnosis:    ICD-10-CM ICD-9-CM   1. Chronic left shoulder pain  M25.512 719.41    G89.29 338.29   2. S/P left rotator cuff repair  Z98.890 V45.89              Timed Treatment:  Manual Therapy:    8     mins  67071;  Therapeutic Exercise:    27     mins  55956;     Neuromuscular Jessee:    18    mins  89116;    Therapeutic Activity:          mins  43599;     Gait Training:           mins  60927;     Ultrasound:          mins  90953;    Electrical Stimulation:         mins  75731 ( );  Dry Needling          mins self-pay  Iontophoresis          mins 56834    Untimed Treatments:  Electrical Stimulation:         mins  43808 ( );  Dry Needling:                     mins  Ultrasound:                         mins  26087;        Timed Treatment:   53   mins   Total Treatment:     53   mins    Cj Wallace, PT  Physical Therapist

## 2023-10-26 ENCOUNTER — TREATMENT (OUTPATIENT)
Dept: PHYSICAL THERAPY | Facility: CLINIC | Age: 58
End: 2023-10-26
Payer: OTHER MISCELLANEOUS

## 2023-10-26 DIAGNOSIS — G89.29 CHRONIC LEFT SHOULDER PAIN: Primary | ICD-10-CM

## 2023-10-26 DIAGNOSIS — Z98.890 S/P LEFT ROTATOR CUFF REPAIR: ICD-10-CM

## 2023-10-26 DIAGNOSIS — M25.512 CHRONIC LEFT SHOULDER PAIN: Primary | ICD-10-CM

## 2023-10-26 NOTE — PROGRESS NOTES
Physical Therapy Daily Treatment Note      Visit #: 21    Micah Viera reports 0/10 pain today at rest.  Pt reports that his shoulder feels good again today and that it actually felt pretty good after last session. Pt reports that he has seen his surgeon who wants him to complete his approved PT and then be released. Pt reports that the surgeon told him that his shoulder will continue to improve although never be normal.         Objective Pt present to PT today with no distress at rest.     Pt with some tightness in ER of the L shoulder.     Pt with some irritation in the L shoulder following activities today although nothing abnormal.       See Exercise, Manual, and Modality Logs for complete treatment.     Assessment/Plan  Pt continues to have some weakness and decreased function in the L shoulder. Pt to continue with PT with his approved visits after he returns from a trip.       Progress per Plan of Care      Visit Diagnosis:    ICD-10-CM ICD-9-CM   1. Chronic left shoulder pain  M25.512 719.41    G89.29 338.29   2. S/P left rotator cuff repair  Z98.890 V45.89              Timed Treatment:  Manual Therapy:    9     mins  77689;  Therapeutic Exercise:    26     mins  35626;     Neuromuscular Jessee:    15    mins  92786;    Therapeutic Activity:          mins  60689;     Gait Training:           mins  10795;     Ultrasound:          mins  33975;    Electrical Stimulation:         mins  24646 ( );  Dry Needling          mins self-pay  Iontophoresis          mins 68559    Untimed Treatments:  Electrical Stimulation:         mins  73849 ( );  Dry Needling:                     mins  Ultrasound:                         mins  18857;        Timed Treatment:   50   mins   Total Treatment:     50   mins    Cj Wallace, PT  Physical Therapist

## 2023-11-14 ENCOUNTER — TREATMENT (OUTPATIENT)
Dept: PHYSICAL THERAPY | Facility: CLINIC | Age: 58
End: 2023-11-14
Payer: OTHER MISCELLANEOUS

## 2023-11-14 DIAGNOSIS — M25.512 CHRONIC LEFT SHOULDER PAIN: Primary | ICD-10-CM

## 2023-11-14 DIAGNOSIS — Z98.890 S/P LEFT ROTATOR CUFF REPAIR: ICD-10-CM

## 2023-11-14 DIAGNOSIS — G89.29 CHRONIC LEFT SHOULDER PAIN: Primary | ICD-10-CM

## 2023-11-14 NOTE — PROGRESS NOTES
Physical Therapy Daily Treatment Note      Visit #: 22    Micah Viera reports 0/10 pain today at rest.  Pt reports that his shoulder is feeling good even after 2 weeks off. Pt states that he was exposed to covid which is why he missed last week.         Objective Pt present to PT today with no distress at rest.     Pt with some tightness in the L shoulder with stretching today although no increased pain.     Pt with one pop in the L shoulder although painless.     Pt with no increased pain with activities in the clinic today to strengthen and stabilize the L shoulder.       See Exercise, Manual, and Modality Logs for complete treatment.     Assessment/Plan  Pt continues to do well with some tightness in the L shoulder and weakness. Pt will be discharged next visit unless otherwise indicated by symptoms.       Progress per Plan of Care      Visit Diagnosis:    ICD-10-CM ICD-9-CM   1. Chronic left shoulder pain  M25.512 719.41    G89.29 338.29   2. S/P left rotator cuff repair  Z98.890 V45.89              Timed Treatment:  Manual Therapy:    10     mins  52045;  Therapeutic Exercise:    26     mins  67135;     Neuromuscular Jessee:    13    mins  83379;    Therapeutic Activity:          mins  19108;     Gait Training:           mins  21308;     Ultrasound:          mins  15447;    Electrical Stimulation:         mins  28506 ( );  Dry Needling          mins self-pay  Iontophoresis          mins 69084    Untimed Treatments:  Electrical Stimulation:         mins  69830 ( );  Dry Needling:                     mins  Ultrasound:                         mins  14962;        Timed Treatment:   49   mins   Total Treatment:     49   mins    Cj Wallace, PT  Physical Therapist

## 2023-11-16 ENCOUNTER — TREATMENT (OUTPATIENT)
Dept: PHYSICAL THERAPY | Facility: CLINIC | Age: 58
End: 2023-11-16
Payer: OTHER MISCELLANEOUS

## 2023-11-16 DIAGNOSIS — G89.29 CHRONIC LEFT SHOULDER PAIN: Primary | ICD-10-CM

## 2023-11-16 DIAGNOSIS — M25.512 CHRONIC LEFT SHOULDER PAIN: Primary | ICD-10-CM

## 2023-11-16 DIAGNOSIS — Z98.890 S/P LEFT ROTATOR CUFF REPAIR: ICD-10-CM

## 2023-11-16 NOTE — PROGRESS NOTES
Physical Therapy Daily Treatment Note      Visit #: 23    Micah Viera reports 0/10 pain today at rest.  Pt reports that he still has some brief pain with stretching. Pt reports that his L shoulder is still weak and will get sore with a lot of work. Pt reports that overall, he is doing well and will continue with PT HEP at home.         Objective          Passive Range of Motion   Left Shoulder   Flexion: 160 degrees   Abduction: 161 degrees   External rotation 90°: 85 degrees   Internal rotation 90°: 67 degrees     Pt present to PT today with no distress at rest.     Pt with no increased pain following activities today.       See Exercise, Manual, and Modality Logs for complete treatment.     Assessment/Plan  Pt continues to improve with strengthening and stabilization activities. Pt to be held today and will be discharged in 30 days if he does not return. Pt is moving well and functioning without pain. L shoulder is not as strong or functional as prior to injury although is able to do most activities without pain.       Progress per Plan of Care  hold pt    Visit Diagnosis:    ICD-10-CM ICD-9-CM   1. Chronic left shoulder pain  M25.512 719.41    G89.29 338.29   2. S/P left rotator cuff repair  Z98.890 V45.89              Timed Treatment:  Manual Therapy:    13     mins  77507;  Therapeutic Exercise:    26     mins  14285;     Neuromuscular Jessee:    12    mins  37165;    Therapeutic Activity:          mins  61679;     Gait Training:           mins  05850;     Ultrasound:          mins  78258;    Electrical Stimulation:         mins  89663 ( );  Dry Needling          mins self-pay  Iontophoresis          mins 12887    Untimed Treatments:  Electrical Stimulation:         mins  85380 ( );  Dry Needling:                     mins  Ultrasound:                         mins  86731;        Timed Treatment:   51   mins   Total Treatment:     51   mins    Cj Wallace, PT  Physical Therapist

## 2025-04-20 NOTE — PROGRESS NOTES
Physical Therapy Daily Treatment Note      Visit #: 3    Micah Viera reports 3/10 pain today at rest.  Pt reports that his shoulder hurts in the morning and he just got up. Pt reports that he does better after getting warmed up. Pt reports that going into abduction hurts the L shoulder still when getting above 90 degrees. Pt reports that PROM is comfortable but his AROM is painful.         Objective Pt present to PT today with no distress at rest.     Pt instructed to avoid painful motions when moving the L shoulder.     Pt with catching in the L shoulder with IR relieved with dorsal mobilizations of the GHJ.     Pt with 2 catches in the shoulder with ER wand activity.       See Exercise, Manual, and Modality Logs for complete treatment.     Assessment/Plan  Pt continues to have good motion in the L shoulder with PROM but is more limited with AROM. Pt is doing well with light isometrics and scapular activities. Pt to continue with PT to help improve L shoulder mobility, strength, and function as tolerated and as able per protocol.       Progress per Plan of Care      Visit Diagnosis:    ICD-10-CM ICD-9-CM   1. S/P arthroscopy of left shoulder  Z98.890 V45.89   2. Chronic left shoulder pain  M25.512 719.41    G89.29 338.29            Manual Therapy:    12     mins  11828;  Therapeutic Exercise:    19     mins  33734;     Neuromuscular [place]:    14    mins  15693;    Therapeutic Activity:          mins  85923;     Gait Training:           mins  26518;     Ultrasound:          mins  90956;    Electrical Stimulation:         mins  77312 ( );  Dry Needling          mins self-pay  Iontophoresis          mins 63225      Timed Treatment:   45   mins   Total Treatment:     57   mins    Cj Wallace, PT  Physical Therapist        
Timoteo Maciel)